# Patient Record
Sex: FEMALE | Race: WHITE
[De-identification: names, ages, dates, MRNs, and addresses within clinical notes are randomized per-mention and may not be internally consistent; named-entity substitution may affect disease eponyms.]

---

## 2018-01-10 NOTE — MM
Reason for exam: screening  (asymptomatic).

Last mammogram was performed 1 year and 2 months ago.



History:

Family history of breast cancer in mother at age 76.

Taking hormonal contraceptives for 5 years.



Physical Findings:

A clinical breast exam by your physician is recommended on an annual basis and 

results should be correlated with mammographic findings.



MG 3D Screening Mammo W/Cad

Bilateral CC and MLO view(s) were taken.

Prior study comparison: November 23, 2016, bilateral MG screening mammo w CAD.  

November 19, 2015, bilateral MG screening mammo w CAD.  November 10, 2014, 

bilateral MG screening mammo w CAD.

The breast tissue is heterogeneously dense. This may lower the sensitivity of 

mammography.  There is no discrete abnormality.





ASSESSMENT: Benign, BI-RAD 2



RECOMMENDATION:

Routine screening mammogram of both breasts in 1 year.

## 2018-06-04 ENCOUNTER — HOSPITAL ENCOUNTER (OUTPATIENT)
Dept: HOSPITAL 47 - RADUSMAIN | Age: 48
Discharge: HOME | End: 2018-06-04
Payer: MEDICARE

## 2018-06-04 DIAGNOSIS — H53.459: Primary | ICD-10-CM

## 2018-06-04 PROCEDURE — 70470 CT HEAD/BRAIN W/O & W/DYE: CPT

## 2018-06-04 PROCEDURE — 93880 EXTRACRANIAL BILAT STUDY: CPT

## 2018-06-05 NOTE — US
EXAMINATION TYPE: US carotid duplex BILAT

 

DATE OF EXAM: 6/4/2018

 

COMPARISON: US 9/16/2016

 

CLINICAL HISTORY: H53.459 Loss of peripheral vision.

 

EXAM MEASUREMENTS: 

 

RIGHT:  Peak Systolic Velocity (PSV) cm/sec

----- Right CCA:  90.4  

----- Right ICA:  82.9     

----- Right ECA:  105.7   

ICA/CCA ratio:  0.9    

 

RIGHT:  End Diastole cm/sec

----- Right CCA:  21.0   

----- Right ICA:  23.5      

----- Right ECA:  11.0     

 

LEFT:  Peak Systolic Velocity (PSV) cm/sec

----- Left CCA:  93.5  

----- Left ICA:  92.0   

----- Left ECA:  107.7  

ICA/CCA ratio:  1.0  

 

LEFT:  End Diastole cm/sec

----- Left CCA:  20.0  

----- Left ICA:  39.1   

----- Left ECA:  17.0 

 

VERTEBRALS (direction of flow):

Right Vertebral: Antegrade

Left Vertebral: Antegrade

 

Rhythm:  Normal

 

Minimal amount of plaque visualized bilaterally. No elevated velocities. No significant stenosis. 

 

 

 

IMPRESSION:  

1. No evidence of significant hemodynamic stenosis by carotid ultrasound.

2. Minimal plaque bilaterally.   

 

 

Criteria for Assigning % of Stenosis / Diameter reduction

(Estimation based on the indirect measurements of the internal carotid artery velocities (ICA PSV).

1.  Normal (no stenosis)=ICA PSV < 125 cm/s: ratio < 2.0: ICA EDV<40 cm/s.

2. Less than 50% stenosis=ICA PSV < 125 cm/s: ratio < 2.0: ICA EDV<40 cm/s.

3.  50 to 69% stenosis=ICA PSV of 125 to 230 cm/s: ration 2.0 ? 4.0: ICA EDV  cm/s.

4.  Greater than 70% stenosis to near occlusion= ICA PSV > 230 cm/s: ratio > 4.0: ICA EDV > 100 cm/s.
 

5.  Near occlusion= ICA PSV velocities may be low or undetectable: variable ratio and ICA EDV.

6.  Total occlusion=unable to detect flow.

## 2018-06-06 NOTE — CT
EXAMINATION TYPE: CT brain wo/w con

 

DATE OF EXAM: 6/4/2018

 

COMPARISON: NONE

 

HISTORY: Loss of peripheral vision x 6-9 months.

 

CT DLP: 1949.1mGycm

 

CONTRAST: 

CT scan of the head is performed without and with IV Contrast, patient injected with 100 mL of Isovue
 M300.

 

Unenhanced followed by contrast enhanced CT of the brain is submitted for evaluation.  The ventricles
 are midline.  There is no evidence for intracranial hemorrhage or extra-axial collection.  No mass e
ffects are identified.  Visualized bony calvarium is intact.  Contrast is administered and no enhanci
ng lesions are detected.  No pathologic enhancement is identified.  If symptoms persist consider MRI.
 Near-complete opacification left maxillary sinus.

 

IMPRESSION: No acute intracranial process or enhancing lesion. If symptoms persist consider MRI.

## 2018-06-20 ENCOUNTER — HOSPITAL ENCOUNTER (OUTPATIENT)
Dept: HOSPITAL 47 - LABWHC1 | Age: 48
Discharge: HOME | End: 2018-06-20
Attending: OPHTHALMOLOGY
Payer: MEDICARE

## 2018-06-20 DIAGNOSIS — H20.9: Primary | ICD-10-CM

## 2018-06-20 DIAGNOSIS — Z53.9: ICD-10-CM

## 2019-03-15 ENCOUNTER — HOSPITAL ENCOUNTER (OUTPATIENT)
Dept: HOSPITAL 47 - RADNMMAIN | Age: 49
End: 2019-03-15
Attending: PSYCHIATRY & NEUROLOGY
Payer: MEDICARE

## 2019-03-15 DIAGNOSIS — G25.0: Primary | ICD-10-CM

## 2019-03-15 PROCEDURE — 78607: CPT

## 2019-03-15 NOTE — NM
EXAMINATION TYPE: NM DatScan Brain SPECT

 

DATE OF EXAM: 3/15/2019

 

COMPARISON: 11/2/2017

 

HISTORY: Tremors and vision loss

 

TECHNIQUE:  10 drops of Lugol's solution was administered 1 hour prior to injection as a thyroid bloc
vira agent.  After the administration of 4.35 mCi I-123 Ioflupane DaTscan.  Images obtained 3 hours p
ost injection.  SPECT images of the brain were acquired with axial and coronal reconstructions.  

 

FINDINGS: The axial SPECT images demonstrate normal background activity.  Accounting for head tilt, t
here appears to be symmetric shaped comma-shaped appearance of the bilateral corpus striatum.  

 

IMPRESSION: No abnormality identified..

## 2019-06-16 ENCOUNTER — HOSPITAL ENCOUNTER (OUTPATIENT)
Dept: HOSPITAL 47 - EC | Age: 49
Setting detail: OBSERVATION
LOS: 2 days | Discharge: HOME | End: 2019-06-18
Attending: HOSPITALIST | Admitting: HOSPITALIST
Payer: MEDICARE

## 2019-06-16 VITALS — BODY MASS INDEX: 41.5 KG/M2

## 2019-06-16 DIAGNOSIS — F41.9: ICD-10-CM

## 2019-06-16 DIAGNOSIS — G47.30: ICD-10-CM

## 2019-06-16 DIAGNOSIS — Z91.041: ICD-10-CM

## 2019-06-16 DIAGNOSIS — M19.90: ICD-10-CM

## 2019-06-16 DIAGNOSIS — J45.21: Primary | ICD-10-CM

## 2019-06-16 DIAGNOSIS — H35.52: ICD-10-CM

## 2019-06-16 DIAGNOSIS — H40.9: ICD-10-CM

## 2019-06-16 DIAGNOSIS — M79.7: ICD-10-CM

## 2019-06-16 DIAGNOSIS — Z96.659: ICD-10-CM

## 2019-06-16 DIAGNOSIS — Z91.018: ICD-10-CM

## 2019-06-16 DIAGNOSIS — Z91.010: ICD-10-CM

## 2019-06-16 DIAGNOSIS — Z96.9: ICD-10-CM

## 2019-06-16 DIAGNOSIS — Z79.899: ICD-10-CM

## 2019-06-16 DIAGNOSIS — F32.9: ICD-10-CM

## 2019-06-16 DIAGNOSIS — J20.9: ICD-10-CM

## 2019-06-16 DIAGNOSIS — J01.00: ICD-10-CM

## 2019-06-16 DIAGNOSIS — Z99.89: ICD-10-CM

## 2019-06-16 DIAGNOSIS — Z87.39: ICD-10-CM

## 2019-06-16 PROCEDURE — 85025 COMPLETE CBC W/AUTO DIFF WBC: CPT

## 2019-06-16 PROCEDURE — 94760 N-INVAS EAR/PLS OXIMETRY 1: CPT

## 2019-06-16 PROCEDURE — 71046 X-RAY EXAM CHEST 2 VIEWS: CPT

## 2019-06-16 PROCEDURE — 96375 TX/PRO/DX INJ NEW DRUG ADDON: CPT

## 2019-06-16 PROCEDURE — 94640 AIRWAY INHALATION TREATMENT: CPT

## 2019-06-16 PROCEDURE — 93005 ELECTROCARDIOGRAM TRACING: CPT

## 2019-06-16 PROCEDURE — 99285 EMERGENCY DEPT VISIT HI MDM: CPT

## 2019-06-16 PROCEDURE — 96372 THER/PROPH/DIAG INJ SC/IM: CPT

## 2019-06-16 PROCEDURE — 80053 COMPREHEN METABOLIC PANEL: CPT

## 2019-06-16 PROCEDURE — 36415 COLL VENOUS BLD VENIPUNCTURE: CPT

## 2019-06-16 PROCEDURE — 99284 EMERGENCY DEPT VISIT MOD MDM: CPT

## 2019-06-16 PROCEDURE — 80048 BASIC METABOLIC PNL TOTAL CA: CPT

## 2019-06-16 PROCEDURE — 96376 TX/PRO/DX INJ SAME DRUG ADON: CPT

## 2019-06-16 PROCEDURE — 84484 ASSAY OF TROPONIN QUANT: CPT

## 2019-06-16 PROCEDURE — 96365 THER/PROPH/DIAG IV INF INIT: CPT

## 2019-06-17 LAB
ALBUMIN SERPL-MCNC: 4.8 G/DL (ref 3.5–5)
ALP SERPL-CCNC: 71 U/L (ref 38–126)
ALT SERPL-CCNC: 10 U/L (ref 9–52)
ANION GAP SERPL CALC-SCNC: 11 MMOL/L
AST SERPL-CCNC: 18 U/L (ref 14–36)
BASOPHILS # BLD AUTO: 0 K/UL (ref 0–0.2)
BASOPHILS NFR BLD AUTO: 0 %
BUN SERPL-SCNC: 9 MG/DL (ref 7–17)
CALCIUM SPEC-MCNC: 9.7 MG/DL (ref 8.4–10.2)
CHLORIDE SERPL-SCNC: 108 MMOL/L (ref 98–107)
CO2 SERPL-SCNC: 21 MMOL/L (ref 22–30)
EOSINOPHIL # BLD AUTO: 0.2 K/UL (ref 0–0.7)
EOSINOPHIL NFR BLD AUTO: 1 %
ERYTHROCYTE [DISTWIDTH] IN BLOOD BY AUTOMATED COUNT: 4.48 M/UL (ref 3.8–5.4)
ERYTHROCYTE [DISTWIDTH] IN BLOOD: 15.1 % (ref 11.5–15.5)
GLUCOSE BLD-MCNC: 119 MG/DL (ref 75–99)
GLUCOSE BLD-MCNC: 180 MG/DL (ref 75–99)
GLUCOSE SERPL-MCNC: 145 MG/DL (ref 74–99)
HCT VFR BLD AUTO: 43.2 % (ref 34–46)
HGB BLD-MCNC: 14.4 GM/DL (ref 11.4–16)
LYMPHOCYTES # SPEC AUTO: 0.4 K/UL (ref 1–4.8)
LYMPHOCYTES NFR SPEC AUTO: 3 %
MCH RBC QN AUTO: 32.1 PG (ref 25–35)
MCHC RBC AUTO-ENTMCNC: 33.3 G/DL (ref 31–37)
MCV RBC AUTO: 96.3 FL (ref 80–100)
MONOCYTES # BLD AUTO: 0.2 K/UL (ref 0–1)
MONOCYTES NFR BLD AUTO: 2 %
NEUTROPHILS # BLD AUTO: 12.5 K/UL (ref 1.3–7.7)
NEUTROPHILS NFR BLD AUTO: 94 %
PLATELET # BLD AUTO: 267 K/UL (ref 150–450)
POTASSIUM SERPL-SCNC: 4.3 MMOL/L (ref 3.5–5.1)
PROT SERPL-MCNC: 8 G/DL (ref 6.3–8.2)
SODIUM SERPL-SCNC: 140 MMOL/L (ref 137–145)
WBC # BLD AUTO: 13.4 K/UL (ref 3.8–10.6)

## 2019-06-17 RX ADMIN — INSULIN ASPART SCH UNIT: 100 INJECTION, SOLUTION INTRAVENOUS; SUBCUTANEOUS at 21:20

## 2019-06-17 RX ADMIN — HEPARIN SODIUM SCH UNIT: 5000 INJECTION, SOLUTION INTRAVENOUS; SUBCUTANEOUS at 17:17

## 2019-06-17 RX ADMIN — METHYLPREDNISOLONE SODIUM SUCCINATE SCH MG: 125 INJECTION, POWDER, FOR SOLUTION INTRAMUSCULAR; INTRAVENOUS at 17:16

## 2019-06-17 RX ADMIN — DULOXETINE SCH MG: 60 CAPSULE, DELAYED RELEASE ORAL at 17:16

## 2019-06-17 RX ADMIN — FORMOTEROL FUMARATE DIHYDRATE SCH MCG: 20 SOLUTION RESPIRATORY (INHALATION) at 20:39

## 2019-06-17 RX ADMIN — IPRATROPIUM BROMIDE AND ALBUTEROL SULFATE SCH ML: .5; 3 SOLUTION RESPIRATORY (INHALATION) at 20:39

## 2019-06-17 RX ADMIN — INSULIN ASPART SCH: 100 INJECTION, SOLUTION INTRAVENOUS; SUBCUTANEOUS at 17:44

## 2019-06-17 RX ADMIN — IPRATROPIUM BROMIDE AND ALBUTEROL SULFATE SCH ML: .5; 3 SOLUTION RESPIRATORY (INHALATION) at 15:57

## 2019-06-17 RX ADMIN — IPRATROPIUM BROMIDE AND ALBUTEROL SULFATE SCH ML: .5; 3 SOLUTION RESPIRATORY (INHALATION) at 12:10

## 2019-06-17 RX ADMIN — IPRATROPIUM BROMIDE AND ALBUTEROL SULFATE SCH ML: .5; 3 SOLUTION RESPIRATORY (INHALATION) at 09:11

## 2019-06-17 RX ADMIN — BUDESONIDE SCH MG: 1 SUSPENSION RESPIRATORY (INHALATION) at 20:39

## 2019-06-17 NOTE — ED
URI HPI





- General


Source: patient


Mode of arrival: wheelchair


Limitations: no limitations





<April Barba - Last Filed: 06/17/19 04:01>





<Charley Pandey P - Last Filed: 06/24/19 03:27>





- General


Chief Complaint: Upper Respiratory Infection


Stated Complaint: EMMA


Time Seen by Provider: 06/16/19 23:57





- History of Present Illness


Initial Comments: 


49-year-old female to history of asthma presenting today for chief complaint of 

cough congestion and sinus pressure for the past 2 days.  Patient states she has

had sinus pressure that increases with downward had movements.  She denies any 

headache or neck stiffness.  She states that she also has history of asthma has 

noticed wheezing and increasing short of breath and feels similar to which the 

previous asthma exacerbations.  Patient states she did perform an albuterol 

treatment prior to her presentation in the emergency department.  She states she

did not have very much improvement.  Patient denies any leg swelling chest pain 

hemoptysis.  Patient denies any sputum production.  Patient states she has had 

chills but has not recorded a fever at home.  Remaining review of systems 

negative upon arrival patient appears well no signs of acute distress.  Afebri

le.


 (April Barba)





- Related Data


                                    Allergies











Allergy/AdvReac Type Severity Reaction Status Date / Time


 


Iodinated Contrast- Oral and Allergy  Rash/Hives Verified 06/17/19 07:20





IV Dye     


 


peanut Allergy  Unknown Verified 06/17/19 07:20


 


soy Allergy  Rash/Hives Verified 06/17/19 07:20














Review of Systems


ROS Other: All systems not noted in ROS Statement are negative.





<April Barba - Last Filed: 06/17/19 04:01>


ROS Other: All systems not noted in ROS Statement are negative.





<Charley Pandey - Last Filed: 06/24/19 03:27>


ROS Statement: 


Those systems with pertinent positive or pertinent negative responses have been 

documented in the HPI.








Past Medical History


Additional Past Medical History / Comment(s): glaucoma, RP


History of Any Multi-Drug Resistant Organisms: None Reported


Past Surgical History: Orthopedic Surgery


Additional Past Surgical History / Comment(s): neuro stimulator, knee 

replacement


Past Psychological History: Depression


Smoking Status: Never smoker


Past Alcohol Use History: Rare


Past Drug Use History: None Reported





<April Barba - Last Filed: 06/17/19 04:01>





General Exam


Limitations: no limitations





<April Barba MOJGAN - Last Filed: 06/17/19 04:01>





- General Exam Comments


Initial Comments: 


General:  The patient is awake and alert, in no distress, and does not appear 

acutely ill. 


Eye:  +3 mm pupils are equal, round and reactive to light, extra-ocular 

movements are intact.  No nystagmus.  There is normal conjunctiva bilaterally.  

No signs of icterus.  No photophobia


Ears, nose, mouth and throat:  There are moist mucous membranes and no oral 

lesions.  Oropharynx was not erythematous there is no tonsillar enlargement 

exudates or lesions.  Uvula midline.  Tympanic membranes are not erythematous or

is no effusions bulging or retraction.  No tenderness to palpation of the 

mastoid.  No anterior cervical lymphadenopathy.  Rhinorrhea, clear and bilateral

nares.  No tripoding, no drooling.


Neck:  The neck is supple, there is no tenderness or JVD.  No nuchal rigidity 

negative Brudzinski and Kernig


Cardiovascular:  There is a regular rate and rhythm. No murmur, rub or gallop is

appreciated.


Respiratory:  Lungs sounds are diminished, there is expiratory wheeze. 

Respirations are non-labored, breath sounds are equal.  No stridor, rales, or 

rhonchi.  No retractions or abdominal breathing.


Gastrointestinal:  Soft, non-distended, non-tender abdomen without masses or 

organomegaly noted. There is no rebound or guarding present.  Bowel sounds are 

unremarkable.


Musculoskeletal:  Normal ROM, no tenderness.  Strength 5/5. Sensation intact. 

Radial pulses equal bilaterally 2+.  


Neurological:  A&O x 3. CN II-XII intact, There are no obvious motor or sensory 

deficits. Coordination appears grossly intact. Speech appears normal, no 

muffling.


Skin:  Skin is warm and dry and no rashes or lesions are noted.  No extremity 

edema


Psychiatric:  Cooperative


 (April Barba)





Course





<PeterApril hodges - Last Filed: 06/17/19 04:01>





                                   Vital Signs











  06/16/19 06/17/19 06/17/19





  23:41 00:09 00:35


 


Temperature 98.4 F  


 


Pulse Rate 96  95


 


Respiratory 20 20 





Rate   


 


Blood Pressure 137/85  


 


O2 Sat by Pulse 95  





Oximetry   














  06/17/19 06/17/19 06/17/19





  00:43 01:57 02:07


 


Temperature   


 


Pulse Rate 88 83 87


 


Respiratory   





Rate   


 


Blood Pressure   


 


O2 Sat by Pulse   





Oximetry   














  06/17/19 06/17/19 06/17/19





  02:56 03:11 03:27


 


Temperature   


 


Pulse Rate 84 88 86


 


Respiratory   18





Rate   


 


Blood Pressure   130/92


 


O2 Sat by Pulse   98





Oximetry   














- Reevaluation(s)


Reevaluation #1: 


Upon reevaluation patient states she had improvement after 1 DuoNeb treatment.


Examination revealed continued to wheeze however improvement in air movement.


06/17/19 01:38


 (April Barba)


Reevaluation #2: 


Patient was reevaluated after 2 additional nebulized treatments, minimal 

improvement, patient given 1g Magnesium. Will admit at this time for asthma 

exacerbation


06/17/19 04:01


 (April Barba)





Medical Decision Making





- Lab Data


Result diagrams: 


                                 06/17/19 03:01





                                 06/17/19 03:01





<April Barba - Last Filed: 06/17/19 04:01>





- Lab Data


Result diagrams: 


                                 06/18/19 07:26





                                 06/18/19 07:26





<Charley Pandey - Last Filed: 06/24/19 03:27>





- Medical Decision Making


49-year-old female presenting today for chief complaint of congestion and cough 

sinus pressure and shortness of breath.  Patient has history of asthma.  

Significant diminishment of lung sounds as well as extra wheeze.  Patient is 

able to speak complete sentences was oxygenating at 95% on room air upon 

arrival.  Patient was abdominal breathing, no retractions or significant 

tachypnea. Patient had 1 treatment prior to arrival in the ED. & a total of 3 

treatments in the ER with minimal improvement. Given magnesium. Patient was 

evaluated by attending provider in the ER. Patient CXR WNL. Patient has 

tenderness to palpation of the maxillary sinuses. Given Augmentin. Patient will 

be admitted for breathing treatments, IV steroids. Total of 30 minutes of 

critical care was spent on patient. Dr. Pandey is agreeable with admission. 

Patient is agreeable with admission. Patient transferred to floor in stable 

condition.


 (April Barba)


I was available for consultation in the emergency department. The history and 

physical exam were done by the midlevel provider.  I was consulted for this 

patient's care.  I reviewed the case with the midlevel provider and based on 

their presentation of the patient, I agree with the assessment, medical decision

making and plan of care as documented.





Chart was dictated using Dragon dictation software.  Attempts were made to 

correct any dictation errors however some typographical errors may persist. 

(Charley Pandey)





- Lab Data





                                   Lab Results











  06/17/19 06/17/19 Range/Units





  03:01 03:01 


 


WBC  13.4 H   (3.8-10.6)  k/uL


 


RBC  4.48   (3.80-5.40)  m/uL


 


Hgb  14.4   (11.4-16.0)  gm/dL


 


Hct  43.2   (34.0-46.0)  %


 


MCV  96.3   (80.0-100.0)  fL


 


MCH  32.1   (25.0-35.0)  pg


 


MCHC  33.3   (31.0-37.0)  g/dL


 


RDW  15.1   (11.5-15.5)  %


 


Plt Count  267   (150-450)  k/uL


 


Neutrophils %  94   %


 


Lymphocytes %  3   %


 


Monocytes %  2   %


 


Eosinophils %  1   %


 


Basophils %  0   %


 


Neutrophils #  12.5 H   (1.3-7.7)  k/uL


 


Lymphocytes #  0.4 L   (1.0-4.8)  k/uL


 


Monocytes #  0.2   (0-1.0)  k/uL


 


Eosinophils #  0.2   (0-0.7)  k/uL


 


Basophils #  0.0   (0-0.2)  k/uL


 


Sodium   140  (137-145)  mmol/L


 


Potassium   4.3  (3.5-5.1)  mmol/L


 


Chloride   108 H  ()  mmol/L


 


Carbon Dioxide   21 L  (22-30)  mmol/L


 


Anion Gap   11  mmol/L


 


BUN   9  (7-17)  mg/dL


 


Creatinine   0.66  (0.52-1.04)  mg/dL


 


Est GFR (CKD-EPI)AfAm   >90  (>60 ml/min/1.73 sqM)  


 


Est GFR (CKD-EPI)NonAf   >90  (>60 ml/min/1.73 sqM)  


 


Glucose   145 H  (74-99)  mg/dL


 


Calcium   9.7  (8.4-10.2)  mg/dL


 


Total Bilirubin   0.6  (0.2-1.3)  mg/dL


 


AST   18  (14-36)  U/L


 


ALT   10  (9-52)  U/L


 


Alkaline Phosphatase   71  ()  U/L


 


Total Protein   8.0  (6.3-8.2)  g/dL


 


Albumin   4.8  (3.5-5.0)  g/dL














Critical Care Time


Critical Care Time: Yes


Total Critical Care Time: 30





<Charley Pandey - Last Filed: 06/24/19 03:27>





Disposition


Is patient prescribed a controlled substance at d/c from ED?: No


Time of Disposition: 03:57


Decision to Admit Reason: Admit from EC


Decision Date: 06/17/19


Decision Time: 03:57





<April Barba - Last Filed: 06/17/19 04:01>





<Charley Pandey - Last Filed: 06/24/19 03:27>


Clinical Impression: 


 Shortness of breath, Sinus pressure, Asthma exacerbation





Disposition: ADMITTED AS IP TO THIS HOSP


Condition: Serious

## 2019-06-17 NOTE — HP
HISTORY AND PHYSICAL



CHIEF COMPLAINT:

Shortness of breath.



HISTORY OF PRESENT ILLNESS:

This 49-year-old woman with a past medical history of multiple medical problems,

including asthma, fibromyalgia, history of sleep apnea, history of glaucoma, 
history of

bulging and herniated discs, history of anxiety, depression, being followed by 
Dr. Shantel Maradiaga in the outpatient setting, was living in [place].  The patient has
respiratory difficulties.  

 because of increased shortness of breath and cough and sputum, patient came

 to [facility] for further evaluation and treatment.  There is no 
history

of any fever, rigor or chills. No history of headache, loss of consciousness, 
seizures

at this time.



PAST MEDICAL HISTORY:

1. Asthma.

2. Fibromyalgia.

3. Sleep apnea.

4. History of glaucoma.

5. History of bulging discs.



HOME MEDICATIONS:

1. Spiriva 1 puff daily.

2. Topamax 100 mg p.o. daily.

3. Inderal XL 80 mg p.o. daily.

4. Methotrexate 25 mg Thursday.

5. Xalatan 0.005% one drop both eyes at bedtime.

6. Klonopin 0.5 mg t.i.d.

7. Cymbalta 60 mg daily.

8. Abilify 10 mg daily.



ALLERGIES:

1. IODINATED CONTRAST.

2. PEANUT.

3. SOY.



FAMILY HISTORY:

No history of heart disease or strokes in the family.



SOCIAL HISTORY:

No history of smoking.  No history of alcohol intake.



REVIEW OF SYSTEMS:

ENT: No diminished hearing. No diminished vision.

CARDIOVASCULAR SYSTEM: As mentioned earlier.

RESPIRATORY SYSTEM: As mentioned earlier.

GI: No nausea, vomiting, diarrhea.

: No dysuria or retention.

NERVOUS SYSTEM: No numbness, weakness.

ALLERGY/IMMUNOLOGY: No asthma, hayfever.

MUSCULOSKELETAL: As mentioned earlier.

HEMATOLOGY/ONCOLOGY: No history of anemia.

ENDOCRINE: No history of diabetes, hypothyroidism.

CONSTITUTIONAL: As mentioned earlier.

DERMATOLOGY: Negative.

RHEUMATOLOGY: Negative.

PSYCHIATRY: As mentioned earlier.



PHYSICAL EXAMINATION:

Patient is alert and oriented x3. Pulse 79, blood pressure 123/83, respiration 
15,

temperature 98.5, pulse ox 97% on room air.

HEENT: Conjunctivae normal. Oral mucosa moist.

NECK: No jugular venous distention. No carotid bruit. No lymph node enlargement.

CARDIOVASCULAR SYSTEM:  S1, S2 muffled.

RESPIRATORY SYSTEM: Breath sounds diminished at the bases.  A few scattered 
rhonchi and

crackles.  Expiratory wheezing also present.

ABDOMEN: Soft, non-tender. No mass palpable.

LEGS: No edema. No swelling.

NERVOUS SYSTEM: Higher functions as mentioned earlier. Moves all 4 limbs. No 
focal

motor or sensory deficit.

LYMPHATICS: No lymph node palpable in neck, axillae or groin.

SKIN: No ulcer, rash, bleeding.

JOINTS: No active deforming arthropathy.



LABS:

WBC 13.4, hemoglobin is 14.4.



ASSESSMENT:

1. Bronchial asthma, acute exacerbation, with acute purulent tracheobronchitis.

2. Increased white count.

4. Asthma.

5. Fibromyalgia.

6. Sleep apnea.

7. Glaucoma.

8. History of degenerative joint disease.

9. History of anxiety, depression.

10.History of nerve stimulator.

11.History of knee replacements.



RECOMMENDATIONS AND DISCUSSION:

In this 49-year-old woman who presented with multiple medical issues, at this 
time we

will monitor the patient closely, continue the current medications, continue 
with

symptomatic treatment. I would also recommend intensive bronchodilators, 
steroids and

empiric antibiotics. Pulmonary consultation.  The patient also had some minimal 
wide-

complex QRS complexes in the EKG.  I would recommend EKG and a set of troponins 
as

well.  Prognosis is guarded because of multiple complex medical issues. Further

recommendations to follow. A copy of this dictation is being forwarded to Dr. Maradiaga,

who is the primary physician.



A recent SPECT MRI did not show any acute abnormality.





MMODL / MKN: [id number] / Job#: [id number]

GIANCARLO

## 2019-06-17 NOTE — P.CNPUL
History of Present Illness


Consult date: 06/17/19


Requesting physician: Katia Wade


Reason for consult: asthma


Chief complaint: shortness of breath cough wheezing


History of present illness: 





this is a 49-year-old female with history of asthma since she was a child.  

Patient presented to the ER with 2 days history of sinuses congestion, went on 

to develop cough wheezing and shortness of breath for the last 24 hours.  

Patient denies any fever or chills, denies any hemoptysis, denies any chest 

pain.  Apparently when she was seen in the ER her physical findings were quite 

significant, patient was not improving with bronchodilators, hence patient was 

admitted, placed on antibiotics, steroids, and I was asked to see her on 

consultation.  During my evaluation, the patient was already feeling a bit 

better, less cough and less wheezing less shortness of breath, and she had no 

pressure or fullness over her sinuses during my examination.  Patient is a 

nonsmoker, she is known to have history of asthma but usually her asthma is 

fairly well controlled uses only albuterol as needed.  Patient is also known to 

have history of multiple ALLERGIES including ALLERGY to IV dye, peanuts, and 

soy.





Review of Systems


ONSTITUTIONAL: No fever, no malaise, no fatigue. 


HEENT: No recent visual problems or hearing problems. Denied any sore throat. 

known to have history of retinitis pigmentosa


CARDIOVASCULAR: No  orthopnea, PND, no palpitations, no syncope. 


PULMONARY: as noted in HPI, cough wheezing and shortness of breath and pressure 

and fullness over her sinuses. 


GASTROINTESTINAL: No diarrhea, no nausea, no vomiting, no abdominal pain. 

Normoactive bowel sounds. 


NEUROLOGICAL: No headaches, no weakness, no numbness. .


HEMATOLOGICAL: Denies any bleeding or petechiae. 


GENITOURINARY: Denies any burning micturition, frequency, or urgency. r


MUSCULOSKELETAL/RHEUMATOLOGICAL: Denies any joint pain, swelling, or any muscle 

pain. 


ENDOCRINE: Denies any polyuria or polydipsia.











Past Medical History


Past Medical History: Asthma, Fibromyalgia, Sleep Apnea/CPAP/BIPAP


Additional Past Medical History / Comment(s): glaucoma, RP, bulging and 

herniated discs


History of Any Multi-Drug Resistant Organisms: None Reported


Past Surgical History: Orthopedic Surgery


Additional Past Surgical History / Comment(s): neuro stimulator, knee 

replacement


Past Psychological History: Anxiety, Depression


Smoking Status: Never smoker


Past Alcohol Use History: Rare


Past Drug Use History: None Reported





Medications and Allergies


                                Home Medications











 Medication  Instructions  Recorded  Confirmed  Type


 


ARIPiprazole [Abilify] 10 mg PO DAILY 06/17/19 06/17/19 History


 


DULoxetine HCL [Cymbalta] 60 mg PO DAILY 06/17/19 06/17/19 History


 


Latanoprost [Xalatan 0.005%] 1 drop BOTH EYES HS 06/17/19 06/17/19 History


 


Methotrexate Sodium [Methotrexate] 25 mg PO TH 06/17/19 06/17/19 History


 


Propranolol HCl [Inderal Xl] 80 mg PO DAILY 06/17/19 06/17/19 History


 


Topiramate [Topamax] 100 mg PO DAILY 06/17/19 06/17/19 History


 


Trospium Chloride 20 mg PO DAILY 06/17/19 06/17/19 History


 


clonazePAM [KlonoPIN] 0.5 mg PO TID 06/17/19 06/17/19 History








                                    Allergies











Allergy/AdvReac Type Severity Reaction Status Date / Time


 


Iodinated Contrast- Oral and Allergy  Rash/Hives Verified 06/17/19 07:20





IV Dye     


 


peanut Allergy  Unknown Verified 06/17/19 07:20


 


soy Allergy  Rash/Hives Verified 06/17/19 07:20














Physical Exam


Vitals: 


                                   Vital Signs











  Temp Pulse Pulse Resp BP BP Pulse Ox


 


 06/17/19 16:12   76     


 


 06/17/19 15:59   74     


 


 06/17/19 15:17     15   


 


 06/17/19 15:00  98.9 F   74  15   104/68  97


 


 06/17/19 12:20   78     


 


 06/17/19 12:10   79     


 


 06/17/19 09:22   85     


 


 06/17/19 09:12   79      97


 


 06/17/19 07:00  98.5 F   86  15   123/83  97


 


 06/17/19 04:59  98.2 F   78  16   122/80  98


 


 06/17/19 03:27   86   18  130/92   98


 


 06/17/19 03:11   88     


 


 06/17/19 02:56   84     


 


 06/17/19 02:07   87     


 


 06/17/19 01:57   83     


 


 06/17/19 00:43   88     


 


 06/17/19 00:35   95     


 


 06/17/19 00:09     20   


 


 06/16/19 23:41  98.4 F  96   20  137/85   95








                                Intake and Output











 06/17/19 06/17/19 06/17/19





 06:59 14:59 22:59


 


Other:   


 


  # Voids 1 2 


 


  Weight 99.79 kg  














Physical Exam: Revealed 49-year-old female in no distress.


Head: Atraumatic normocephalic.


HEENT:[Neck is supple.] [No neck masses.] [No thyromegaly.] [No JVD.]


Chest: [wheezing bilaterally more so on forced expiratory maneuver..]


Cardiac Exam: [Normal S1 and S2, no S3 gallop, no murmur.]


Abdomen: [Soft, nontender,  no megaly, no rebound, no guarding, normal bowel 

sounds.]


Extremities: [No clubbing, no edema, no cyanosis.]


Neurological Exam: [No focal neurologic deficit.]


skin: No rashes.


Lymphatics: No lymphadenopathy.


Psychiatric: Normal mood affect and mental status examination





Results





- Laboratory Findings


CBC and BMP: 


                                 06/17/19 03:01





                                 06/17/19 03:01


Abnormal lab findings: 


                                  Abnormal Labs











  06/17/19 06/17/19 06/17/19





  03:01 03:01 17:17


 


WBC  13.4 H  


 


Neutrophils #  12.5 H  


 


Lymphocytes #  0.4 L  


 


Chloride   108 H 


 


Carbon Dioxide   21 L 


 


Glucose   145 H 


 


POC Glucose (mg/dL)    119 H














- Diagnostic Findings


Chest x-ray: image reviewed (no evidence of active disease)





Assessment and Plan


Assessment: 





impression: Acute exacerbation of bronchial asthma





Acute maxillary sinusitis





Acute upper respirator tract infection





History of retinitis pigmentosa.





Recommendation: Continue present treatment plan including present 

bronchodilators/DuoNeb, methylprednisolone, Rocephin,Pulmicort updrafts, patient

will likely be much better in the next 24-48 hours, and consider discharge 

planning once the patient is significantly improved.  We'll continue to follow.


Time with Patient: Greater than 30

## 2019-06-17 NOTE — XR
EXAM:

  XR Chest, 2 Views

 

CLINICAL HISTORY:

  ITS.REASON XR Reason: Pain

 

TECHNIQUE:

  Frontal and lateral views of the chest.

 

COMPARISON:

  No relevant prior studies available.

 

FINDINGS:

  Lungs:  Unremarkable.  The lungs are clear.

  Pleural space:  Unremarkable.  No pneumothorax.

  Heart:  Unremarkable.  No cardiomegaly.

  Mediastinum:  Unremarkable.

  Bones/joints:  Unremarkable.

  Tubes, lines and devices:  Presumed spinal cord and vagal stimulator 

devices.

 

IMPRESSION:     

  No acute findings.

## 2019-06-18 VITALS — RESPIRATION RATE: 15 BRPM

## 2019-06-18 VITALS — SYSTOLIC BLOOD PRESSURE: 115 MMHG | TEMPERATURE: 98.2 F | DIASTOLIC BLOOD PRESSURE: 74 MMHG

## 2019-06-18 VITALS — HEART RATE: 72 BPM

## 2019-06-18 LAB
ANION GAP SERPL CALC-SCNC: 9 MMOL/L
BASOPHILS # BLD AUTO: 0 K/UL (ref 0–0.2)
BASOPHILS NFR BLD AUTO: 0 %
BUN SERPL-SCNC: 14 MG/DL (ref 7–17)
CALCIUM SPEC-MCNC: 9.3 MG/DL (ref 8.4–10.2)
CHLORIDE SERPL-SCNC: 112 MMOL/L (ref 98–107)
CO2 SERPL-SCNC: 21 MMOL/L (ref 22–30)
EOSINOPHIL # BLD AUTO: 0 K/UL (ref 0–0.7)
EOSINOPHIL NFR BLD AUTO: 0 %
ERYTHROCYTE [DISTWIDTH] IN BLOOD BY AUTOMATED COUNT: 4.12 M/UL (ref 3.8–5.4)
ERYTHROCYTE [DISTWIDTH] IN BLOOD: 15.2 % (ref 11.5–15.5)
GLUCOSE BLD-MCNC: 126 MG/DL (ref 75–99)
GLUCOSE BLD-MCNC: 130 MG/DL (ref 75–99)
GLUCOSE SERPL-MCNC: 123 MG/DL (ref 74–99)
HCT VFR BLD AUTO: 39.9 % (ref 34–46)
HGB BLD-MCNC: 13.2 GM/DL (ref 11.4–16)
LYMPHOCYTES # SPEC AUTO: 0.8 K/UL (ref 1–4.8)
LYMPHOCYTES NFR SPEC AUTO: 6 %
MCH RBC QN AUTO: 32.1 PG (ref 25–35)
MCHC RBC AUTO-ENTMCNC: 33.1 G/DL (ref 31–37)
MCV RBC AUTO: 96.9 FL (ref 80–100)
MONOCYTES # BLD AUTO: 0.2 K/UL (ref 0–1)
MONOCYTES NFR BLD AUTO: 2 %
NEUTROPHILS # BLD AUTO: 11.3 K/UL (ref 1.3–7.7)
NEUTROPHILS NFR BLD AUTO: 92 %
PLATELET # BLD AUTO: 293 K/UL (ref 150–450)
POTASSIUM SERPL-SCNC: 4.2 MMOL/L (ref 3.5–5.1)
SODIUM SERPL-SCNC: 142 MMOL/L (ref 137–145)
WBC # BLD AUTO: 12.3 K/UL (ref 3.8–10.6)

## 2019-06-18 RX ADMIN — BUDESONIDE SCH MG: 1 SUSPENSION RESPIRATORY (INHALATION) at 08:50

## 2019-06-18 RX ADMIN — METHYLPREDNISOLONE SODIUM SUCCINATE SCH: 125 INJECTION, POWDER, FOR SOLUTION INTRAMUSCULAR; INTRAVENOUS at 15:09

## 2019-06-18 RX ADMIN — INSULIN ASPART SCH: 100 INJECTION, SOLUTION INTRAVENOUS; SUBCUTANEOUS at 07:22

## 2019-06-18 RX ADMIN — IPRATROPIUM BROMIDE AND ALBUTEROL SULFATE SCH ML: .5; 3 SOLUTION RESPIRATORY (INHALATION) at 08:50

## 2019-06-18 RX ADMIN — METHYLPREDNISOLONE SODIUM SUCCINATE SCH MG: 125 INJECTION, POWDER, FOR SOLUTION INTRAMUSCULAR; INTRAVENOUS at 00:01

## 2019-06-18 RX ADMIN — IPRATROPIUM BROMIDE AND ALBUTEROL SULFATE SCH ML: .5; 3 SOLUTION RESPIRATORY (INHALATION) at 12:44

## 2019-06-18 RX ADMIN — IPRATROPIUM BROMIDE AND ALBUTEROL SULFATE SCH ML: .5; 3 SOLUTION RESPIRATORY (INHALATION) at 15:33

## 2019-06-18 RX ADMIN — HEPARIN SODIUM SCH UNIT: 5000 INJECTION, SOLUTION INTRAVENOUS; SUBCUTANEOUS at 09:46

## 2019-06-18 RX ADMIN — METHYLPREDNISOLONE SODIUM SUCCINATE SCH MG: 125 INJECTION, POWDER, FOR SOLUTION INTRAMUSCULAR; INTRAVENOUS at 05:17

## 2019-06-18 RX ADMIN — FORMOTEROL FUMARATE DIHYDRATE SCH MCG: 20 SOLUTION RESPIRATORY (INHALATION) at 08:50

## 2019-06-18 RX ADMIN — DULOXETINE SCH MG: 60 CAPSULE, DELAYED RELEASE ORAL at 09:45

## 2019-06-18 RX ADMIN — INSULIN ASPART SCH: 100 INJECTION, SOLUTION INTRAVENOUS; SUBCUTANEOUS at 14:31

## 2019-06-18 NOTE — P.PN
Subjective


Progress Note Date: 06/18/19


Principal diagnosis: 





Acute exacerbation of mild intermittent asthma


this is a 49-year-old female with history of asthma since she was a child.  

Patient presented to the ER with 2 days history of sinuses congestion, went on 

to develop cough wheezing and shortness of breath for the last 24 hours.  

Patient denies any fever or chills, denies any hemoptysis, denies any chest 

pain.  Apparently when she was seen in the ER her physical findings were quite 

significant, patient was not improving with bronchodilators, hence patient was 

admitted, placed on antibiotics, steroids, and I was asked to see her on 

consultation.  During my evaluation, the patient was already feeling a bit b

ely, less cough and less wheezing less shortness of breath, and she had no 

pressure or fullness over her sinuses during my examination.  Patient is a 

nonsmoker, she is known to have history of asthma but usually her asthma is 

fairly well controlled uses only albuterol as needed.  Patient is also known to 

have history of multiple ALLERGIES including ALLERGY to IV dye, peanuts, and 

soy.





Reevaluated today on 6/18/2019, patient is feeling much better, hardly any 

wheezing, she does have some occasional cough.  No fever no chills no hemoptysis

no chest pain.  Labs were reviewed including CBC and basic metabolic profile.  

Patient is doing great compared to how she felt yesterday.  Hence I will r

ecommend that the patient could be discharged home today on prednisone 30 mg 

tapered over 2 weeks, Ceftin 500 twice a day for 10 days, albuterol updrafts 4 

times a day and when necessary, Symbicort 160/4.52 puffs twice a day, and 

Singulair 10 mg daily.  Patient is to follow-up with me in one week post 

discharge.











Objective





- Vital Signs


Vital signs: 


                                   Vital Signs











Temp  98.5 F   06/18/19 07:05


 


Pulse  71   06/18/19 12:54


 


Resp  15   06/18/19 07:05


 


BP  108/78   06/18/19 07:05


 


Pulse Ox  96   06/18/19 08:50








                                 Intake & Output











 06/17/19 06/18/19 06/18/19





 18:59 06:59 18:59


 


Other:   


 


  Voiding Method   Toilet


 


  # Voids 2 2 














- Exam





Physical Exam: Revealed 49-year-old female in no distress.


Head: Atraumatic normocephalic.


HEENT:[Neck is supple.] [No neck masses.] [No thyromegaly.] [No JVD.]


Chest: [Minimal wheezing on forced expiratory maneuver only.


Cardiac Exam: [Normal S1 and S2, no S3 gallop, no murmur.]


Abdomen: [Soft, nontender,  no megaly, no rebound, no guarding, normal bowel 

sounds.]


Extremities: [No clubbing, no edema, no cyanosis.]


Neurological Exam: [No focal neurologic deficit.]


skin: No rashes.


Lymphatics: No lymphadenopathy.


Psychiatric: Normal mood affect and mental status examination











- Labs


CBC & Chem 7: 


                                 06/18/19 07:26





                                 06/18/19 07:26


Labs: 


                  Abnormal Lab Results - Last 24 Hours (Table)











  06/17/19 06/17/19 06/18/19 Range/Units





  17:17 20:45 06:50 


 


WBC     (3.8-10.6)  k/uL


 


Neutrophils #     (1.3-7.7)  k/uL


 


Lymphocytes #     (1.0-4.8)  k/uL


 


Chloride     ()  mmol/L


 


Carbon Dioxide     (22-30)  mmol/L


 


Glucose     (74-99)  mg/dL


 


POC Glucose (mg/dL)  119 H  180 H  126 H  (75-99)  mg/dL














  06/18/19 06/18/19 06/18/19 Range/Units





  07:26 07:26 11:21 


 


WBC  12.3 H    (3.8-10.6)  k/uL


 


Neutrophils #  11.3 H    (1.3-7.7)  k/uL


 


Lymphocytes #  0.8 L    (1.0-4.8)  k/uL


 


Chloride   112 H   ()  mmol/L


 


Carbon Dioxide   21 L   (22-30)  mmol/L


 


Glucose   123 H   (74-99)  mg/dL


 


POC Glucose (mg/dL)    130 H  (75-99)  mg/dL














Assessment and Plan


Assessment: 





impression: Acute exacerbation of bronchial asthma





Acute maxillary sinusitis





Acute upper respirator tract infection





History of retinitis pigmentosa.





Recommendation: Suggest discharging the patient home today on prednisone 30 mg 

tapered over the next 2 weeks, Symbicort 160/4.52 puffs twice a day, Ceftin 500 

twice a day for 10 days, Singulair 10 mg daily at bedtime, patient will come 

back and see me in the office on outpatient basis hopefully in the next 7-10 

days.


Time with Patient: Less than 30

## 2019-06-19 NOTE — DS
DISCHARGE SUMMARY



FINAL DIAGNOSES:

1. Bronchial asthma acute exacerbation with acute purulent tracheobronchitis.

2. Increased WBC.

3. Asthma.

4. Fibromyalgia.

5. Sleep apnea.

6. Glaucoma.

7. History of degenerative joint disease.

8. History of anxiety, depression.

9. History of nerve stimulator.

10.History of knee replacement.



DISCHARGE DISPOSITION:

The patient will be discharged in stable condition with guarded prognosis.



HISTORY OF PRESENT ILLNESS:

This 49-year-old woman with a past medical history of multiple medical problems

admitted with bronchial asthma, acute exacerbation, acute purulent 
tracheobronchitis.

Patient treated with bronchodilators and IV steroids and antibiotics.  Patient 
improved

significantly. Dr. Mc saw the patient,  recommended outpatient followup.



On exam, vitals are stable.

CARDIOVASCULAR: S1, S2 muffled.

RESPIRATORY:  A few scattered rhonchi and crackles.

ABDOMEN:  Soft.

NERVOUS SYSTEM: No focal deficits.



 cleared the patient discharge.



DISCHARGE ADVICE:

1. Diet is cardiac.

2. Activity limited until followup.

3. Follow up with Dr. Maradiaga in 2 to 3 days.

4. Follow up with Pulmonary, Dr. Mc, as recommended.

MEDICATIONS ARE:

1. Abilify 10 mg p.o. daily.

2. Cymbalta 60 mg p.o. daily.

3. Inderal XL 80 mg p.o. daily.

4. Klonopin 0.5 mg p.o. t.i.d.

5. Methotrexate 25 mg p.o. Thursday.

6. Topamax 100 mg p.o. daily.

7. Xalatan 1 drop both eyes q.h.s.

8. Ceftin 500 mg p.o. b.i.d. for 3 days.

9. Prednisone taper 40 mg daily for 3 days, 30 for 3 days, 20 for 3 days 10 for 
3 days

    and stop.

10.Albuterol 2 puffs q.6.

11.Singulair 10 mg q.h.s.

12.Symbicort 160/4.5 two puffs b.i.d.

Once again, the patient will be discharged in a stable condition with guarded

prognosis.





MMODL / IJN: [id number] / Job#: [id number]

MTDD

## 2019-06-21 ENCOUNTER — HOSPITAL ENCOUNTER (OUTPATIENT)
Dept: HOSPITAL 47 - LABWHC1 | Age: 49
Discharge: HOME | End: 2019-06-21
Attending: HOSPITALIST
Payer: MEDICARE

## 2019-06-21 DIAGNOSIS — J45.909: Primary | ICD-10-CM

## 2019-06-21 LAB
ANION GAP SERPL CALC-SCNC: 10 MMOL/L (ref 4–12)
BASOPHILS # BLD AUTO: 0 K/UL (ref 0–0.2)
BASOPHILS NFR BLD AUTO: 0 %
BUN SERPL-SCNC: 14 MG/DL (ref 9–27)
BUN/CREAT SERPL: 15.56 RATIO (ref 12–20)
CALCIUM SPEC-MCNC: 9.3 MG/DL (ref 8.7–10.3)
CHLORIDE SERPL-SCNC: 107 MMOL/L (ref 96–109)
CO2 SERPL-SCNC: 23 MMOL/L (ref 21.6–31.8)
EOSINOPHIL # BLD AUTO: 0.1 K/UL (ref 0–0.7)
EOSINOPHIL NFR BLD AUTO: 1 %
ERYTHROCYTE [DISTWIDTH] IN BLOOD BY AUTOMATED COUNT: 4.56 M/UL (ref 3.8–5.4)
ERYTHROCYTE [DISTWIDTH] IN BLOOD: 13.7 % (ref 11.5–15.5)
GLUCOSE SERPL-MCNC: 124 MG/DL (ref 70–110)
HCT VFR BLD AUTO: 43.8 % (ref 34–46)
HGB BLD-MCNC: 14.3 GM/DL (ref 11.4–16)
LYMPHOCYTES # SPEC AUTO: 1.3 K/UL (ref 1–4.8)
LYMPHOCYTES NFR SPEC AUTO: 14 %
MCH RBC QN AUTO: 31.3 PG (ref 25–35)
MCHC RBC AUTO-ENTMCNC: 32.6 G/DL (ref 31–37)
MCV RBC AUTO: 96 FL (ref 80–100)
MONOCYTES # BLD AUTO: 0.1 K/UL (ref 0–1)
MONOCYTES NFR BLD AUTO: 1 %
NEUTROPHILS # BLD AUTO: 7.8 K/UL (ref 1.3–7.7)
NEUTROPHILS NFR BLD AUTO: 84 %
PLATELET # BLD AUTO: 391 K/UL (ref 150–450)
POTASSIUM SERPL-SCNC: 4.1 MMOL/L (ref 3.5–5.5)
SODIUM SERPL-SCNC: 140 MMOL/L (ref 135–145)
WBC # BLD AUTO: 9.4 K/UL (ref 3.8–10.6)

## 2019-06-21 PROCEDURE — 80048 BASIC METABOLIC PNL TOTAL CA: CPT

## 2019-06-21 PROCEDURE — 36415 COLL VENOUS BLD VENIPUNCTURE: CPT

## 2019-06-21 PROCEDURE — 85025 COMPLETE CBC W/AUTO DIFF WBC: CPT

## 2019-07-24 ENCOUNTER — HOSPITAL ENCOUNTER (OUTPATIENT)
Dept: HOSPITAL 47 - RADCTMAIN | Age: 49
Discharge: HOME | End: 2019-07-24
Attending: PSYCHIATRY & NEUROLOGY
Payer: MEDICARE

## 2019-07-24 DIAGNOSIS — M50.31: ICD-10-CM

## 2019-07-24 DIAGNOSIS — M99.71: ICD-10-CM

## 2019-07-24 DIAGNOSIS — M51.86: ICD-10-CM

## 2019-07-24 DIAGNOSIS — Z88.8: ICD-10-CM

## 2019-07-24 DIAGNOSIS — M51.36: ICD-10-CM

## 2019-07-24 DIAGNOSIS — M99.73: Primary | ICD-10-CM

## 2019-07-24 DIAGNOSIS — Z91.041: ICD-10-CM

## 2019-07-24 PROCEDURE — 72125 CT NECK SPINE W/O DYE: CPT

## 2019-07-24 PROCEDURE — 72131 CT LUMBAR SPINE W/O DYE: CPT

## 2019-07-24 NOTE — CT
EXAMINATION TYPE: CT lumbar spine wo con

 

DATE OF EXAM: 7/24/2019

 

COMPARISON: None

 

HISTORY: L-spine pain

 

CT DLP: 1372.1 mGycm

 

 

 

Unenhanced CT of the lumbar spine was performed.  Bone and soft tissue window settings are submitted 
as well as coronal and sagittal reconstructions. 

 

L1-L2: Normal disc space height.  No disc herniation protrusion or central stenosis.  No facet joint 
arthropathy.  No evidence for foraminal encroachment.  

 

L2-L3: Normal disc space height.  No disc herniation protrusion or central stenosis.  No facet joint 
arthropathy.  No evidence for foraminal encroachment.  

 

L3-L4: Normal disc space height.  No disc herniation protrusion or central stenosis.  No facet joint 
arthropathy.  No evidence for foraminal encroachment.  

 

L4-L5: Mild degenerative disc place narrowing. Posterior disc bulge.  No herniation or central stenos
is. No foraminal encroachment.

 

L5-S1: Normal disc space height.  No disc herniation protrusion or central stenosis.  No facet joint 
arthropathy.  No evidence for foraminal encroachment.  

 

No paraspinal masses are identified.  Lumbar segments are free if fracture.

 

IMPRESSION:

 

1. Degenerative disc space narrowing and disc bulging at L4-5.

## 2019-07-24 NOTE — CT
EXAMINATION TYPE: CT cervical spine wo con

 

DATE OF EXAM: 7/24/2019

 

COMPARISON: None

 

HISTORY: Cervicalgia

 

CT DLP: 758.3 mGycm

 

Unenhanced CT of the cervical spine was performed with bone and soft tissue window settings submitted
.  Coronal and sagittal reconstruction is obtained.  

 

C2-3: Within normal limits

 

C3-4: Mild degenerative disc space narrowing. Extensive streak artifact from neural transmitter resul
ts in limited evaluation of this level.

 

C4-5:Mild degenerative disc space narrowing. Extensive streak artifact from neural transmitter result
s in limited evaluation of this level.

 

5-6: Moderate degenerative disc space narrowing. Ventral spondylosis. Mild posterior disc bulge witho
ut herniation or central stenosis. No evidence for foraminal encroachment.

 

C6-7: Moderate to severe degenerative disc space narrowing. Ventral spondylosis. Posterior disc bulge
 with mild effacement ventral thecal sac. No evidence for antral stenosis or foraminal encroachment.

 

C7-T1: Within normal limits.

 

IMPRESSION:

1. Degenerative disc space narrowing as discussed.

2. Neural transmitter is noted extending from the C2 level through C4 level resulting in extensive st
reak artifact and exam limitation.

## 2020-08-07 ENCOUNTER — HOSPITAL ENCOUNTER (OUTPATIENT)
Dept: HOSPITAL 47 - RADPROMAIN | Age: 50
Discharge: HOME | End: 2020-08-07
Attending: NEUROLOGICAL SURGERY
Payer: MEDICARE

## 2020-08-07 VITALS — SYSTOLIC BLOOD PRESSURE: 118 MMHG | DIASTOLIC BLOOD PRESSURE: 70 MMHG | HEART RATE: 71 BPM

## 2020-08-07 VITALS — TEMPERATURE: 98.7 F

## 2020-08-07 VITALS — RESPIRATION RATE: 16 BRPM

## 2020-08-07 DIAGNOSIS — M50.123: ICD-10-CM

## 2020-08-07 DIAGNOSIS — Z96.82: ICD-10-CM

## 2020-08-07 DIAGNOSIS — Z90.49: ICD-10-CM

## 2020-08-07 DIAGNOSIS — G89.29: Primary | ICD-10-CM

## 2020-08-07 DIAGNOSIS — G54.4: ICD-10-CM

## 2020-08-07 PROCEDURE — 72132 CT LUMBAR SPINE W/DYE: CPT

## 2020-08-07 PROCEDURE — 62305 MYELOGRAPHY LUMBAR INJECTION: CPT

## 2020-08-07 PROCEDURE — 72126 CT NECK SPINE W/DYE: CPT

## 2020-08-07 NOTE — CT
EXAMINATION TYPE: CT cervical spine w con

 

DATE OF EXAM: 8/7/2020

 

COMPARISON: CT cervical spine July 24, 2019. MRI cervical spine August 22, 2013.

 

HISTORY: chronic pain

 

CT DLP: 670.6 mGycm.  Automated Exposure Control for Dose Reduction was Utilized.

 

TECHNIQUE:  CT scan of the cervical spine is obtained following intrathecal injection of contrast, ax
ial images are obtained, sagittal and coronal reformatted images are also reviewed. 15 cc of Isovue 2
00 injected.

 

FINDINGS: Successful opacification of the cervical spinal canal. Cervical spine is visualized in its 
entirety from C1 through upper thoracic levels, demonstrates stable alignment with normal cervical cu
rvature centered at C5-C6 level.  Prevertebral soft tissue appears within normal limits.  The C1-C2 a
rticulation is within normal limits on the coronal images. Vertebral body heights are maintained. Mod
erate anterior spurring and mild to moderate disc space narrowing C5-C6 and C6-C7 levels. Redemonstra
tion of spinal stimulator from mid C3 through mid C5 vertebra and the posterior spinal canal causing 
streak artifact limiting evaluation at these levels.

 

Axial images at C2-C3 level are felt within normal limits.

 

Axial images at C5-C6 level are within normal limits on axial image 48.

 

Axial images at C6-C7 level show persistent broad-based disc protrusion effacing the anterior thecal 
sac. Bilateral neural foramina are patent.

 

Axial images at C7-T1 level remain within normal limits.

 

Stimulators in the right mid to lower cervical paraspinal muscles noted.

 

IMPRESSION: Multilevel degenerative changes as detailed above. Suboptimal evaluating the C3-C4 level 
which had disc herniation on 2013 MRI due to artifact from spinal stimulator.  Disc herniation C6-C7 
level redemonstrated without obvious progression from 2013 MRI.

## 2020-08-07 NOTE — CT
EXAMINATION TYPE: CT lumbar spine w con

 

DATE OF EXAM: 8/7/2020

 

COMPARISON: CT lumbar spine July 24, 2019. MRI lumbar spine August 22, 2013.

 

HISTORY: chronic back pain.

 

CT DLP: 1358.2 mGycm

Automated exposure control for dose reduction was used.

 

CONTRAST: 

CT scan of the lumbar is performed after injection of intrathecal contrast, patient injected with 15 
ml mL of Isovue M200.

 

Enhanced CT of the lumbar spine was performed.  Bone and soft tissue window settings are submitted as
 well as coronal and sagittal reconstructions. 

 

5 lumbar type vertebra are redemonstrated. There is satisfactory alignment seen. Vertebral body heigh
ts and disc space heights are maintained. No suspicious new posterior disc herniations are seen. Ther
e is poor visualization of conus with poor separation of the ascending lumbosacral nerve fibers which
 are more clumped in appearance versus 2013 MRI.

 

Axial images show mild facet degenerative changes bilaterally at L2-L3 and L3-L4 levels.

 

Axial images at L4-L5 level show moderate facet degenerative changes bilaterally but spinal canal is 
preserved.

 

Axial images at L5-S1 level show mild to moderate facet degenerative changes bilaterally.

 

Neural foramina are patent bilaterally at all lumbar levels.

 

There is anteverted uterus with central metallic IUD. Spinal canal is not effaced. Cholecystectomy cl
ips are noted. Stimulator device in the left posterior soft tissue is noted.

 

IMPRESSION: No obvious new significant disc herniation. Only suspicious finding is poor separation of
 the ascending lumbosacral nerve roots raising concern for underlying arachnoiditis. Correlate clinic
ally.

## 2020-08-07 NOTE — FL
EXAMINATION TYPE: FL myelogram 2 or more regions

 

DATE OF EXAM: 8/7/2020

 

COMPARISON: NONE

 

HISTORY: Chronic neck and back pain.

 

Informed consent was obtained and all the patient's questions were answered.  The L3-L4 level was loc
alized under fluoroscopy.  Standard sterile technique was utilized as well as appropriate local anest
hesia 1% Lidocaine .  Spinal needle was introduced into the thecal sac under fluoroscopic guidance an
d 15 mL's of Isovue 200 was injected.  The patient tolerated the procedure well and left the [facility] in stable condition.  CT myelography is to follow. This report was dictated separately. 

 

8 spot images saved during procedure. Total 3 minutes 13 seconds fluoroscopic time utilized. Images s
how successful accessed and contrast injection. Incidental cholecystectomy clips and overlying stimul
ator.

 

IMPRESSION: Successful myelography lumbar spine for subsequent CT.

## 2020-12-08 ENCOUNTER — HOSPITAL ENCOUNTER (OUTPATIENT)
Dept: HOSPITAL 47 - RADMAMWWP | Age: 50
Discharge: HOME | End: 2020-12-08
Attending: OBSTETRICS & GYNECOLOGY
Payer: MEDICARE

## 2020-12-08 DIAGNOSIS — Z12.31: Primary | ICD-10-CM

## 2020-12-08 DIAGNOSIS — M85.88: ICD-10-CM

## 2020-12-08 DIAGNOSIS — N95.1: ICD-10-CM

## 2020-12-08 PROCEDURE — 77067 SCR MAMMO BI INCL CAD: CPT

## 2020-12-08 PROCEDURE — 77063 BREAST TOMOSYNTHESIS BI: CPT

## 2020-12-08 PROCEDURE — 77080 DXA BONE DENSITY AXIAL: CPT

## 2020-12-08 NOTE — BD
EXAMINATION TYPE: Axial Bone Density

 

DATE OF EXAM: 12/8/2020

 

COMPARISON: NONE

 

CLINICAL HISTORY:

 

Height:  5 FT 2 1/4 IN

Weight:  210

 

FRAX RISK QUESTIONS:

Alcohol (3 or more units per day):  NO

Family History (Parent hip fracture):  YES

Glucocorticoids (More than 3mos):  NO

           (Ex: prednisone, prednisolone, methylprednisolone, dexamethasone, and hydrocortisone).    
     

History of Fracture in Adulthood: NO

Secondary Osteoporosis:

  1.  Type 1 Diabetes: NO

  2.  Hyperthyroidism: NO

  3.  Menopause before 45: NO

  4.  Malnutrition: NO

  5.  Chronic liver disease: NO

Rheumatoid Arthritis: NO

Current Tobacco Use: NO

 

RISK FACTORS 

HISTORY OF: 

Surgery to Spine/Hip(right/left)/Wrist (right/left): NEURO STIMULATER PLACED NEAR C SPINE AND LUMBAR 
SPINE AUG 2020 AND NOV 2020 FOR PAIN

Family History of Osteoporosis: NO

Active: SOMEWHAT

Diet low in dairy products/other sources of calcium:  NO

Postmenopausal woman: ON BIRTH CONTROL FOR TWO YEARS

Lost more than 2 inches in height since high school: NO

 

 

MEDICATIONS: 

Additional Medications: BIRTH CONTROL, CYMBALTA,TOPOMAX, INDURAL, FOLIC ACID, ABILIFY, OXYBUTIN, CLON
AZEPAM, VITAMIN , MONTELUKAST, METHOTREXATE, LATANOPROST, 

 

 

Additional History:

 

 

EXAM MEASUREMENTS: 

Bone mineral densitometry was performed using the [employer] System.

Bone mineral density as measured about the Lumbar spine is:  

----- L1-L4(G/cm2): 1.221

T Score Values are as follows:

----- L2: 0.9

----- L3: 0.8

----- L4: 0.2

----- L1-L4: 0.3

BASELINE

Bone mineral density about the R hip (g/cm2): 1.112

Bone mineral density about the L hip (g/cm2): 1.094

T Score values are as follows:

-----R Neck: 0.5

-----L Neck: 0.4

-----R Total: 1.3

-----L Total: 1.7

BASELINE

 

IMPRESSION:

Normal (Values between +1 and -1 indicate normal bone mass).  Consider repeating this study in 5 year
s or sooner if there is some new clinical indication.

 

NOTE:  T-SCORE=SD OF THE YOUNG ADULT MEAN.

## 2020-12-09 NOTE — MM
Reason for exam: screening  (asymptomatic).

Last mammogram was performed 2 years and 11 months ago.



History:

Family history of breast cancer in mother at age 76.

Taking hormonal contraceptives for 5 years.



Physical Findings:

A clinical breast exam by your physician is recommended on an annual basis and 

results should be correlated with mammographic findings.



MG 3D Screening Mammo W/Cad

Bilateral CC and MLO view(s) were taken.

Prior study comparison: January 9, 2018, bilateral MG 3d screening mammo w/cad.  

November 23, 2016, bilateral MG screening mammo w CAD.

There are scattered fibroglandular densities.  No significant changes when 

compared with prior studies.





ASSESSMENT: Benign, BI-RAD 2



RECOMMENDATION:

Routine screening mammogram of both breasts in 1 year.

## 2021-07-12 ENCOUNTER — HOSPITAL ENCOUNTER (OUTPATIENT)
Dept: HOSPITAL 47 - RADCTMAIN | Age: 51
Discharge: HOME | End: 2021-07-12
Attending: PSYCHIATRY & NEUROLOGY
Payer: MEDICARE

## 2021-07-12 DIAGNOSIS — M47.817: Primary | ICD-10-CM

## 2021-07-12 PROCEDURE — 72131 CT LUMBAR SPINE W/O DYE: CPT

## 2021-07-12 NOTE — CT
EXAMINATION TYPE: CT lumbar spine wo con

 

DATE OF EXAM: 7/12/2021 8:27 AM

 

COMPARISON: 8/7/20 

HISTORY: Lower back pain with pain and numbness down right leg.

 

CT DLP: 1342.9 mGycm

Automated exposure control for dose reduction was used.

 

Unenhanced CT of the lumbar spine was performed.  Bone and soft tissue window settings are submitted 
as well as coronal and sagittal reconstructions. 

 

L1-L2: Normal disc space height.  No disc herniation protrusion or central stenosis.  No facet joint 
arthropathy.  No evidence for foraminal encroachment.

 

L2-L3: Normal disc space height.  No disc herniation protrusion or central stenosis. Mild facet joint
 arthropathy. No evidence for foraminal encroachment.

 

L3-L4: Normal disc space height.  No disc herniation protrusion or central stenosis. Mild facet joint
 arthropathy..  No evidence for foraminal encroachment.

 

L4-L5: Normal disc space height.  No disc herniation protrusion or central stenosis.  No facet joint 
arthropathy.  No evidence for foraminal encroachment.

 

L5-S1: Normal disc space height.  No disc herniation protrusion or central stenosis. Moderate facet j
oint arthropathy redemonstrated. No evidence for foraminal encroachment.

 

IMPRESSION:

Facet joint arthropathy. No significant disc herniation protrusion or central stenosis at this time.

## 2021-10-29 ENCOUNTER — HOSPITAL ENCOUNTER (OUTPATIENT)
Dept: HOSPITAL 47 - ORWHC2ENDO | Age: 51
Discharge: HOME | End: 2021-10-29
Attending: INTERNAL MEDICINE
Payer: MEDICARE

## 2021-10-29 VITALS — BODY MASS INDEX: 45.7 KG/M2

## 2021-10-29 VITALS — DIASTOLIC BLOOD PRESSURE: 81 MMHG | SYSTOLIC BLOOD PRESSURE: 120 MMHG | HEART RATE: 58 BPM

## 2021-10-29 VITALS — TEMPERATURE: 97.8 F | RESPIRATION RATE: 18 BRPM

## 2021-10-29 DIAGNOSIS — F41.9: ICD-10-CM

## 2021-10-29 DIAGNOSIS — F32.9: ICD-10-CM

## 2021-10-29 DIAGNOSIS — G47.33: ICD-10-CM

## 2021-10-29 DIAGNOSIS — Z12.11: Primary | ICD-10-CM

## 2021-10-29 DIAGNOSIS — J45.909: ICD-10-CM

## 2021-10-29 PROCEDURE — 81025 URINE PREGNANCY TEST: CPT

## 2021-10-29 NOTE — P.PCN
Date of Procedure: 10/29/21


Procedure(s) Performed: 


BRIEF HISTORY: Patient is a 51-year-old pleasant white female scheduled for an 

elective colonoscopy as a part of screening for colorectal neoplasia.





PROCEDURE PERFORMED: Colonoscopy. 





PREOPERATIVE DIAGNOSIS: Screening for colon cancer. 





IV sedation per Anesthesia. 





PROCEDURE: After informed consent was obtained, the patient, was brought into 

the endoscopy unit. IV sedation was administered by Anesthesia under continuous 

monitoring.  Digital rectal examination was normal. Initially the Olympus CF-160

flexible video colonoscope was then inserted in the rectum, gradually advanced 

into the cecum without any difficulty. Careful examination was performed as the 

scope was gradually being withdrawn. Ileocecal valve and the appendiceal orifice

were visualized and appeared normal.  Prep was excellent. Mucosa of the cecum, 

ascending colon, transverse colon, descending colon, sigmoid colon, and rectum 

appeared normal. Retroflexion was performed in the rectum and no lesions were 

seen. The patient tolerated the procedure well. 





IMPRESSION: Normal-appearing colon from rectum to cecum with no evidence of 

colorectal neoplasia .





RECOMMENDATIONS:  Findings of this examination were discussed with the patient 

as well as a family.  She was advised to have a repeat screening colonoscopy in 

10 years..

## 2021-12-09 ENCOUNTER — HOSPITAL ENCOUNTER (OUTPATIENT)
Dept: HOSPITAL 47 - RADMAMWWP | Age: 51
Discharge: HOME | End: 2021-12-09
Attending: OBSTETRICS & GYNECOLOGY
Payer: MEDICARE

## 2021-12-09 DIAGNOSIS — Z80.3: ICD-10-CM

## 2021-12-09 DIAGNOSIS — Z12.31: Primary | ICD-10-CM

## 2021-12-09 PROCEDURE — 77063 BREAST TOMOSYNTHESIS BI: CPT

## 2021-12-09 PROCEDURE — 77067 SCR MAMMO BI INCL CAD: CPT

## 2021-12-10 NOTE — MM
Reason for exam: screening  (asymptomatic).

Last mammogram was performed 1 year ago.



History:

Family history of breast cancer in mother at age 76.

Taking hormonal contraceptives for 5 years.



Physical Findings:

A clinical breast exam by your physician is recommended on an annual basis and 

results should be correlated with mammographic findings.



MG 3D Screening Mammo W/Cad

Bilateral CC and MLO view(s) were taken.

Prior study comparison: December 8, 2020, bilateral MG 3d screening mammo w/cad.  

January 9, 2018, bilateral MG 3d screening mammo w/cad.

The breast tissue is heterogeneously dense. This may lower the sensitivity of 

mammography.  There is no discrete abnormality.  No significant changes when 

compared with prior studies.





ASSESSMENT: Negative, BI-RAD 1



RECOMMENDATION:

Routine screening mammogram of both breasts in 1 year.

## 2022-01-26 ENCOUNTER — HOSPITAL ENCOUNTER (OUTPATIENT)
Dept: HOSPITAL 47 - BARWHC3 | Age: 52
End: 2022-01-26
Attending: SURGERY
Payer: MEDICARE

## 2022-01-26 VITALS
HEART RATE: 74 BPM | DIASTOLIC BLOOD PRESSURE: 80 MMHG | SYSTOLIC BLOOD PRESSURE: 120 MMHG | RESPIRATION RATE: 16 BRPM | TEMPERATURE: 98.2 F

## 2022-01-26 VITALS — BODY MASS INDEX: 39.3 KG/M2

## 2022-01-26 DIAGNOSIS — M17.0: ICD-10-CM

## 2022-01-26 DIAGNOSIS — E44.0: ICD-10-CM

## 2022-01-26 DIAGNOSIS — M06.9: ICD-10-CM

## 2022-01-26 DIAGNOSIS — M47.9: ICD-10-CM

## 2022-01-26 DIAGNOSIS — I11.9: ICD-10-CM

## 2022-01-26 DIAGNOSIS — N19: ICD-10-CM

## 2022-01-26 DIAGNOSIS — K50.90: ICD-10-CM

## 2022-01-26 DIAGNOSIS — K74.1: ICD-10-CM

## 2022-01-26 DIAGNOSIS — G89.4: ICD-10-CM

## 2022-01-26 DIAGNOSIS — K21.9: ICD-10-CM

## 2022-01-26 DIAGNOSIS — D50.8: ICD-10-CM

## 2022-01-26 DIAGNOSIS — Z91.041: ICD-10-CM

## 2022-01-26 DIAGNOSIS — E55.9: ICD-10-CM

## 2022-01-26 DIAGNOSIS — H40.9: ICD-10-CM

## 2022-01-26 DIAGNOSIS — F32.A: ICD-10-CM

## 2022-01-26 DIAGNOSIS — G47.00: ICD-10-CM

## 2022-01-26 DIAGNOSIS — Z91.010: ICD-10-CM

## 2022-01-26 DIAGNOSIS — J45.909: ICD-10-CM

## 2022-01-26 DIAGNOSIS — N32.81: ICD-10-CM

## 2022-01-26 DIAGNOSIS — M79.7: ICD-10-CM

## 2022-01-26 DIAGNOSIS — E66.01: Primary | ICD-10-CM

## 2022-01-26 DIAGNOSIS — Z91.018: ICD-10-CM

## 2022-01-26 LAB
CHOLEST SERPL-MCNC: 179 MG/DL (ref 0–200)
ERYTHROCYTE [DISTWIDTH] IN BLOOD BY AUTOMATED COUNT: 4.4 X 10*6/UL (ref 4.1–5.2)
ERYTHROCYTE [DISTWIDTH] IN BLOOD: 13.6 % (ref 11.5–14.5)
HCT VFR BLD AUTO: 42.4 % (ref 37.2–46.3)
HDLC SERPL-MCNC: 42.5 MG/DL (ref 40–60)
HGB BLD-MCNC: 14.3 G/DL (ref 12–15)
LDLC SERPL CALC-MCNC: 108.5 MG/DL (ref 0–131)
MCH RBC QN AUTO: 32.5 PG (ref 27–32)
MCHC RBC AUTO-ENTMCNC: 33.7 G/DL (ref 32–37)
MCV RBC AUTO: 96.4 FL (ref 80–97)
NRBC BLD AUTO-RTO: 0 /100 WBCS (ref 0–0)
PLATELET # BLD AUTO: 268 X 10*3/UL (ref 140–440)
PREALB SERPL-MCNC: 19.6 MG/DL (ref 18–42)
TRIGL SERPL-MCNC: 140 MG/DL (ref 0–149)
VIT B12 SERPL-MCNC: 427 PG/ML (ref 200–944)
VLDLC SERPL CALC-MCNC: 28 MG/DL (ref 5–40)
WBC # BLD AUTO: 7.62 X 10*3/UL (ref 4.5–10)

## 2022-01-26 PROCEDURE — 84630 ASSAY OF ZINC: CPT

## 2022-01-26 PROCEDURE — 82728 ASSAY OF FERRITIN: CPT

## 2022-01-26 PROCEDURE — 84443 ASSAY THYROID STIM HORMONE: CPT

## 2022-01-26 PROCEDURE — 82746 ASSAY OF FOLIC ACID SERUM: CPT

## 2022-01-26 PROCEDURE — 93005 ELECTROCARDIOGRAM TRACING: CPT

## 2022-01-26 PROCEDURE — 85027 COMPLETE CBC AUTOMATED: CPT

## 2022-01-26 PROCEDURE — 85610 PROTHROMBIN TIME: CPT

## 2022-01-26 PROCEDURE — 84255 ASSAY OF SELENIUM: CPT

## 2022-01-26 PROCEDURE — 82607 VITAMIN B-12: CPT

## 2022-01-26 PROCEDURE — 84425 ASSAY OF VITAMIN B-1: CPT

## 2022-01-26 PROCEDURE — 83970 ASSAY OF PARATHORMONE: CPT

## 2022-01-26 PROCEDURE — 80323 ALKALOIDS NOS: CPT

## 2022-01-26 PROCEDURE — 99203 OFFICE O/P NEW LOW 30 MIN: CPT

## 2022-01-26 PROCEDURE — 83540 ASSAY OF IRON: CPT

## 2022-01-26 PROCEDURE — 84134 ASSAY OF PREALBUMIN: CPT

## 2022-01-26 PROCEDURE — 85730 THROMBOPLASTIN TIME PARTIAL: CPT

## 2022-01-26 PROCEDURE — 83735 ASSAY OF MAGNESIUM: CPT

## 2022-01-26 PROCEDURE — 80061 LIPID PANEL: CPT

## 2022-01-26 PROCEDURE — 83550 IRON BINDING TEST: CPT

## 2022-01-26 PROCEDURE — 83036 HEMOGLOBIN GLYCOSYLATED A1C: CPT

## 2022-01-26 PROCEDURE — 84100 ASSAY OF PHOSPHORUS: CPT

## 2022-01-26 PROCEDURE — 82525 ASSAY OF COPPER: CPT

## 2022-01-26 PROCEDURE — 82306 VITAMIN D 25 HYDROXY: CPT

## 2022-01-26 PROCEDURE — 36415 COLL VENOUS BLD VENIPUNCTURE: CPT

## 2022-01-26 PROCEDURE — 84590 ASSAY OF VITAMIN A: CPT

## 2022-01-26 PROCEDURE — 80307 DRUG TEST PRSMV CHEM ANLYZR: CPT

## 2022-01-26 PROCEDURE — 80053 COMPREHEN METABOLIC PANEL: CPT

## 2022-01-26 NOTE — P.HPBAR
Bariatric H&P





- History & Physicial


H&P Date: 01/26/22


History & Physicial: 


Visit/CC: 





Patient initial contact: 





Initial weight: 


Initial weight in pounds: 


Height: 


Initial BMI: 





Last weight: 





Current weight: 


Current weight in pounds: 


Current BMI: 





Ideal body weight (based on NIH guidelines): 





Excess body weight loss: 








The patient is a 51 year-old F who presents for Bariatric Assessment.











DATE OF SERVICE: 01/26/2022





REASON FOR CONSULTATION: Initial bariatric evaluation. 





HISTORY OF PRESENT ILLNESS: Alessia Beltrán is a 51-year-old female who comes with

lifelong morbid obesity. She comes in looking into the gastric band. She has 

tried multiple diets. She had her gallbladder removed. Her sister has trouble 

with weight including her brother. She has tried Ali and protein diets with her 

doctor. She does not want an invasive procedure. She has gastroesophageal reflux

disease. Her highest weight is 228 pounds. She has lost 13 pounds in 3 months. 

She has neurostimulator in the neck and her back for chronic pain. She has 

troubles sleeping. She has insomnia. She has fibromyalgia. She comes first time 

in consultation for weight loss. 





At height of 5 feet 2 inches, her ideal body weight is 135 pounds.  Her highest 

weight is 228 pounds, body mass index 41.8. She comes in 214 pounds.  Her body 

mass index is 39.3.  She is 80 pounds overweight.





PAST MEDICAL HISTORY: 


1.  Morbid obesity due to excess calories


2.  Body mass index of 39.3


3.  Depressive disorder


4.  Glaucoma


5.  Hypertensive heart disease


6.  Overactive bladder


7.  Rheumatoid arthritis


8.  Insomnia


9.  Osteoarthritis of the knees


10.  Chronic pain syndrome


11.  Osteoarthritis lower back


12.  Asthma


13.  Fibromyalgia


14.  Gastroesophageal reflux disease.





PAST SURGICAL HISTORY: 


1.  Knee replacement


2.  Neurostimulator


3.  Cholecystectomy





HOME MEDICATIONS: 


                                Home Medications











 Medication  Instructions  Recorded  Confirmed


 


ARIPiprazole [Abilify] 10 mg PO DAILY 06/17/19 01/27/22


 


DULoxetine HCL [Cymbalta] 60 mg PO DAILY 06/17/19 01/27/22


 


Latanoprost [Xalatan 0.005%] 1 drop BOTH EYES HS 06/17/19 01/27/22


 


Propranolol HCl [Inderal Xl] 80 mg PO DAILY 06/17/19 01/27/22


 


Topiramate [Topamax] 100 mg PO BID 06/17/19 01/27/22


 


clonazePAM [KlonoPIN] 0.5 mg PO TID 06/17/19 01/27/22


 


Oxybutynin Chloride [Ditropan XL] 10 mg PO BID 07/28/20 01/27/22


 


Brimonidine/Dorzolamide/Pf 1 drop BOTH EYES BID 10/27/21 01/27/22





[Brimonidine 0.15%-Dorzolam 2%]   


 


Folic Acid 1 mg PO DAILY 10/27/21 01/27/22


 


Minocycline HCl [Minocin] 100 mg PO DAILY 10/27/21 01/27/22


 


Ramelteon 8 mg PO AS DIRECTED 01/27/22 01/27/22


 


metHOTREXate sodium [Methotrexate] 12.5 mg PO WEEKLY 01/27/22 01/27/22








                                  Previous Rx's











 Medication  Instructions  Recorded


 


Montelukast [Singulair] 10 mg PO HS #30 tab 06/18/19

















ALLERGIES:


                                    Allergies











Allergy/AdvReac Type Severity Reaction Status Date / Time


 


Iodinated Contrast Media Allergy  Rash/Hives Verified 10/29/21 10:15


 


peanut Allergy  Rash/Hives Verified 10/29/21 10:15


 


soy Allergy  Rash/Hives Verified 10/29/21 10:15

















SOCIAL HISTORY: Denies tobacco use.   





FAMILY HISTORY: No family history of ulcerative colitis disease or Crohn's 

disease. Family history of morbid obesity. No lupus in the family.  No reports 

of stomach or esophageal cancer.  Her sister has trouble with weight including 

her brother.





REVIEW OF ORGAN SYSTEMS: 


CONSTITUTIONAL: At height of 5 feet 2 inches, her ideal body weight is 135 

pounds.  Her highest weight is 228 pounds, body mass index 41.8. She comes in 

214 pounds.  Her body mass index is 39.3.  She is 80 pounds overweight.


HEENT: Denies any active troubles with hearing. Glaucoma 


ENDOCRINE: Denies diabetes. Denies hypothyroidism. 


CARDIOVASCULAR: Denies past reports of palpitations or heart attacks or chest 

pain.  Has hypertensive heart disease.


RESPIRATORY: Has daytime somnolence and trouble sleeping. Has asthma.


GASTROINTESTINAL: Denies any bright red blood per rectum.  No diarrhea. No 

constipation. Has gastroesophageal reflux disease.


GENITOURINARY: Has bladder urgency. No recent blood in urine


MUSCULOSKELETAL: Has lower back pain and joint pain.  Denies history of 

bilateral lower extremity edema. Has chronic pain. 


NEURO: Has chronic migraines. No seizure disorders. Has fibromyalgia. 


PSYCH: Has depression. No suicidal ideation. Has anxiety. Has insomnia.


RHEUMATOLOGIC: No lupus.  No rheumatoid arthritis.  


HEMATOLOGIC: Denies any abnormal bleeding or bruising.  Denies past history of 

DVTs. 


SKIN: No rash.  No skin cancer.





PHYSICAL EXAM: 


VITAL SIGNS: Height 5 foot 2 inches, weight 214 pounds. BMI 39.3


                                   Vital Signs











Temp  98.2 F   01/26/22 16:33


 


Pulse  74   01/26/22 16:33


 


Resp  16   01/26/22 16:33


 


BP  120/80   01/26/22 16:33


 


Pulse Ox      

















GENERAL: Well-developed in no acute distress.  


HEENT: No scleral icterus.  Extraocular movements grossly intact.  Hears 

conversational speech.  No nasal drainage.


NECK: Supple without lymphadenopathy. 


CHEST: Nonlabored respirations with equal bilateral excursions. 


CARDIOVASCULAR: Regular rate and regular rhythm. Distal 2+ pulses. 


ABDOMEN: Obese, soft, nontender, nondistended. 


MUSCULOSKELETAL: No clubbing, cyanosis. 


NEURO: No focal or lateralizing signs. Cranial nerves 2 through 12 grossly 

within normal limits. 


PSYCH: Appropriate affect. Alert and oriented to person, place and time.


SKIN: Good skin turgor.  Well perfused.





ASSESSMENT: 


1.  Morbid obesity due to excess calories


2.  Body mass index of 39.3


3.  Depressive disorder


4.  Glaucoma


5.  Hypertensive heart disease


6.  Overactive bladder


7.  Rheumatoid arthritis


8.  Insomnia


9.  Osteoarthritis of the knees


10.  Chronic pain syndrome


11.  Osteoarthritis lower back


12.  Asthma


13.  Fibromyalgia


14.  Gastroesophageal reflux disease.





PLAN: 


1.  Surgical options including a band, gastric bypass, sleeve gastrectomy were 

described in detail.  Alternatives such as gastric balloon including duodenal 

switch were described. 


2.  The [place] bariatric surgical collaborative data and outcomes calculator 

were described with surgical options.


3.  Recommend a bariatric metabolic panel to evaluate for micro- including 

macronutrient deficiencies. 


4.  For history of daytime somnolence, recommend evaluation and treatment for 

sleep apnea.


5.  Dietary surveillance and counseling was reviewed. Increased protein intake 

over 65 grams daily advised.


6.  Will need cardiac risk assessment.


7.  Recommend medical risk assessment.


8.  Psych assessment per insurance guidelines.


9.  Recommend upper endoscopy.


10.  Recommend 12-lead EKG.


11.  Recommend urine nicotine testing pre-op


12.  Recommend urine drug screen





Thank you for this consultation.




















Past Medical History


Past Medical History: Asthma, Fibromyalgia, Sleep Apnea/CPAP/BIPAP


Additional Past Medical History / Comment(s): glaucoma, RP, bulging and 

herniated discs


History of Any Multi-Drug Resistant Organisms: None Reported


Past Surgical History: Orthopedic Surgery


Additional Past Surgical History / Comment(s): neuro stimulator, knee 

replacement


Smoking Status: Never smoker





- Past Family History


  ** Mother


Family Medical History: Cancer


Additional Family Medical History / Comment(s): lung cancer





Results





- Labs





                                 01/26/22 16:28





                                 01/26/22 16:28





Bariatric Checklist


Checklist: 


Plan: 





Checklist: 





EGD: 


1. Hiatal hernia: 


2. H. Pylori: 





HgbA1c: 





Vitamin D: 





Smoking: Never smoker





Primary care physician referral: 





Psychiatry clearance: 





Cardiology clearance: 





Sleep study: 





Diet journal: 





VTE risk score: 





VTE risk level: 





Rehab needs at discharge:

## 2022-01-27 LAB
ALBUMIN SERPL-MCNC: 4.4 G/DL (ref 3.8–4.9)
ALBUMIN/GLOB SERPL: 1.57 G/DL (ref 1.6–3.17)
ALP SERPL-CCNC: 73 U/L (ref 41–126)
ALT SERPL-CCNC: 7 U/L (ref 8–44)
ANION GAP SERPL CALC-SCNC: 16.8 MMOL/L (ref 10–18)
APTT BLD: 31.6 SEC (ref 23.5–31)
AST SERPL-CCNC: 13 U/L (ref 13–35)
BUN SERPL-SCNC: 12.4 MG/DL (ref 9–27)
BUN/CREAT SERPL: 15.5 RATIO (ref 12–20)
CALCIUM SPEC-MCNC: 9.3 MG/DL (ref 8.7–10.3)
CHLORIDE SERPL-SCNC: 107 MMOL/L (ref 96–109)
CO2 SERPL-SCNC: 17.2 MMOL/L (ref 20–27.5)
FERRITIN SERPL-MCNC: 130 NG/ML (ref 10–291)
GLOBULIN SER CALC-MCNC: 2.8 G/DL (ref 1.6–3.3)
GLUCOSE SERPL-MCNC: 79 MG/DL (ref 70–110)
INR PPP: 0.97 (ref 0.9–1.11)
IRON SERPL-MCNC: 85 UG/DL (ref 50–170)
MAGNESIUM SPEC-SCNC: 2 MG/DL (ref 1.5–2.4)
POTASSIUM SERPL-SCNC: 3.9 MMOL/L (ref 3.5–5.5)
PROT SERPL-MCNC: 7.2 G/DL (ref 6.2–8.2)
PT BLD: 11 SEC (ref 9.9–11.9)
SODIUM SERPL-SCNC: 141 MMOL/L (ref 135–145)
TIBC SERPL-MCNC: 308 UG/DL (ref 228–460)
ZINC SERPL-MCNC: 72 UG/DL (ref 60–130)

## 2022-01-28 LAB — VIT B1 BLD-MCNC: 93 UG/L (ref 38–122)

## 2022-01-29 LAB — ANABASINE UR-MCNC: <2 NG/ML (ref ?–2)

## 2022-04-21 ENCOUNTER — HOSPITAL ENCOUNTER (OUTPATIENT)
Dept: HOSPITAL 47 - LABWHC1 | Age: 52
Discharge: HOME | End: 2022-04-21
Attending: INTERNAL MEDICINE
Payer: MEDICARE

## 2022-04-21 DIAGNOSIS — E78.2: Primary | ICD-10-CM

## 2022-04-21 LAB
ALT SERPL-CCNC: 10 U/L (ref 8–44)
ANION GAP SERPL CALC-SCNC: 10.9 MMOL/L (ref 10–18)
AST SERPL-CCNC: 16 U/L (ref 13–35)
BUN SERPL-SCNC: 10.3 MG/DL (ref 9–27)
BUN/CREAT SERPL: 10.3 RATIO (ref 12–20)
CALCIUM SPEC-MCNC: 9.4 MG/DL (ref 8.7–10.3)
CHLORIDE SERPL-SCNC: 112 MMOL/L (ref 96–109)
CHOLEST SERPL-MCNC: 186 MG/DL (ref 0–200)
CO2 SERPL-SCNC: 23.1 MMOL/L (ref 20–27.5)
ERYTHROCYTE [DISTWIDTH] IN BLOOD BY AUTOMATED COUNT: 4.29 X 10*6/UL (ref 4.1–5.2)
ERYTHROCYTE [DISTWIDTH] IN BLOOD: 14.1 % (ref 11.5–14.5)
GLUCOSE SERPL-MCNC: 100 MG/DL (ref 70–110)
HCT VFR BLD AUTO: 42.7 % (ref 37.2–46.3)
HDLC SERPL-MCNC: 36.3 MG/DL (ref 40–60)
HGB BLD-MCNC: 14 G/DL (ref 12–15)
LDLC SERPL CALC-MCNC: 118.1 MG/DL (ref 0–131)
MCH RBC QN AUTO: 32.6 PG (ref 27–32)
MCHC RBC AUTO-ENTMCNC: 32.8 G/DL (ref 32–37)
MCV RBC AUTO: 99.5 FL (ref 80–97)
NRBC BLD AUTO-RTO: 0 /100 WBCS (ref 0–0)
PLATELET # BLD AUTO: 249 X 10*3/UL (ref 140–440)
POTASSIUM SERPL-SCNC: 3.9 MMOL/L (ref 3.5–5.5)
SODIUM SERPL-SCNC: 146 MMOL/L (ref 135–145)
TRIGL SERPL-MCNC: 158 MG/DL (ref 0–149)
VLDLC SERPL CALC-MCNC: 31.6 MG/DL (ref 5–40)
WBC # BLD AUTO: 4.39 X 10*3/UL (ref 4.5–10)

## 2022-04-21 PROCEDURE — 84450 TRANSFERASE (AST) (SGOT): CPT

## 2022-04-21 PROCEDURE — 80048 BASIC METABOLIC PNL TOTAL CA: CPT

## 2022-04-21 PROCEDURE — 84443 ASSAY THYROID STIM HORMONE: CPT

## 2022-04-21 PROCEDURE — 36415 COLL VENOUS BLD VENIPUNCTURE: CPT

## 2022-04-21 PROCEDURE — 85027 COMPLETE CBC AUTOMATED: CPT

## 2022-04-21 PROCEDURE — 84460 ALANINE AMINO (ALT) (SGPT): CPT

## 2022-04-21 PROCEDURE — 80061 LIPID PANEL: CPT

## 2022-05-18 ENCOUNTER — HOSPITAL ENCOUNTER (OUTPATIENT)
Dept: HOSPITAL 47 - RADUSWWP | Age: 52
Discharge: HOME | End: 2022-05-18
Attending: OBSTETRICS & GYNECOLOGY
Payer: MEDICARE

## 2022-05-18 DIAGNOSIS — Z97.5: ICD-10-CM

## 2022-05-18 DIAGNOSIS — N83.209: Primary | ICD-10-CM

## 2022-05-18 PROCEDURE — 76830 TRANSVAGINAL US NON-OB: CPT

## 2022-05-18 PROCEDURE — 76856 US EXAM PELVIC COMPLETE: CPT

## 2022-05-18 NOTE — US
EXAMINATION TYPE: US pelvis complete transvag

 

DATE OF EXAM: 2022

 

COMPARISON: NONE

 

CLINICAL HISTORY: Z97.5 IUD POSITION. IUD position. Kyleena IUD was placed in November. Patient state
s she had abnormal bleeding x 2 months after. .

 

TECHNIQUE:  Transvaginal (TV) and Transabdominal (TA) .  Transabdominal sonographic images of the pel
vis were acquired.  Transvaginal sonographic images were medically necessary to better assess the fol
lowing anatomy: Right ovary and endometrium.

 

Date of LMP:

 

EXAM MEASUREMENTS:

 

Uterus:  8.7 x 5.2 x 3.8 cm

Endometrial Stripe: 0.56 cm

Right Ovary: 2.4 x 1.5 x 2.0 cm

Left Ovary:  3.4 x 2.0 x 1.4 cm

 

 

1. Uterus:  Anteverted  Appears slightly heterogeneous. Anechoic area seen in cervix: 1.3 x 1.0 x 0.9
 cm.

2. Endometrium:  IUD appears to be in upper endometrium.

3. Right Ovary:  Largest anechoic area seen measures 1.1 x 1.1 x 1.1 cm. Hyperechoic focus seen: 0.2 
x 0.2 x 0.1 cm.

4. Left Ovary:  Anechoic area seen: 2.2 x 1.6 x 1.1 cm.

5. Bilateral Adnexa:  Complex area seen adjacent to the left ovary: 1.8 x 2.0 x 1.0 cm.

6. Posterior cul-de-sac:  Appears wnl

 

 

 

IMPRESSION: 

1. IUD within the endometrial canal.

2. Simple appearing ovarian cysts.

3. Complex left adnexal cyst. Underlying neoplasm is not excluded.  Follow-up in 6 weeks is recommend
ed.

## 2022-07-07 ENCOUNTER — HOSPITAL ENCOUNTER (OUTPATIENT)
Dept: HOSPITAL 47 - RADUSWWP | Age: 52
Discharge: HOME | End: 2022-07-07
Attending: OBSTETRICS & GYNECOLOGY
Payer: MEDICARE

## 2022-07-07 DIAGNOSIS — N83.202: Primary | ICD-10-CM

## 2022-07-07 DIAGNOSIS — N88.8: ICD-10-CM

## 2022-07-07 PROCEDURE — 76856 US EXAM PELVIC COMPLETE: CPT

## 2022-07-07 PROCEDURE — 76830 TRANSVAGINAL US NON-OB: CPT

## 2022-07-07 NOTE — US
EXAMINATION TYPE: US pelvic complete

 

DATE OF EXAM: 7/7/2022

 

COMPARISON: US 2022

 

CLINICAL HISTORY: N83.202 Ovarian cyst left side.. Follow up left ovarian cyst

 

TECHNIQUE: .  Transabdominal sonographic images of the pelvis were acquired.  Transvaginal sonographi
c images were medically necessary to better assess the following anatomy:

 

Date of LMP:  Unknown

 

EXAM MEASUREMENTS:

 

Uterus:  7.7 x 4.2 x 3.7 cm

Endometrial Stripe: 0.8 cm

Right Ovary:  not seen 

Left Ovary:  2.9 x 1.5 x 2.5 cm

 

 

 

1. Uterus:  anteverted, heterogeneous, multiple nabothian cysts

2. Endometrium:  appears wnl, IUD seen in place

3. Right Ovary:  not seen

4. Left Ovary:  1.6cm cyst

5. Bilateral Adnexa:  left adnexa: 1.8cm complex area seen adjacent to left ovary 

6. Posterior cul-de-sac:  wnl

 

 

 

IMPRESSION:

1. Complex cyst left ovary could reflect small hemorrhagic cyst. Consider follow-up study in 6 weeks.


2. IUD is noted to be in place.

## 2023-03-18 ENCOUNTER — HOSPITAL ENCOUNTER (EMERGENCY)
Dept: HOSPITAL 47 - EC | Age: 53
Discharge: HOME | End: 2023-03-18
Payer: MEDICARE

## 2023-03-18 VITALS — DIASTOLIC BLOOD PRESSURE: 67 MMHG | TEMPERATURE: 98.2 F | HEART RATE: 85 BPM | SYSTOLIC BLOOD PRESSURE: 103 MMHG

## 2023-03-18 VITALS — RESPIRATION RATE: 18 BRPM

## 2023-03-18 DIAGNOSIS — Z91.048: ICD-10-CM

## 2023-03-18 DIAGNOSIS — F41.9: ICD-10-CM

## 2023-03-18 DIAGNOSIS — R22.43: Primary | ICD-10-CM

## 2023-03-18 DIAGNOSIS — G47.30: ICD-10-CM

## 2023-03-18 DIAGNOSIS — Z91.018: ICD-10-CM

## 2023-03-18 DIAGNOSIS — J45.909: ICD-10-CM

## 2023-03-18 DIAGNOSIS — F32.A: ICD-10-CM

## 2023-03-18 DIAGNOSIS — K59.00: ICD-10-CM

## 2023-03-18 DIAGNOSIS — G47.00: ICD-10-CM

## 2023-03-18 LAB
ALBUMIN SERPL-MCNC: 4.1 G/DL (ref 3.5–5)
ALP SERPL-CCNC: 97 U/L (ref 38–126)
ALT SERPL-CCNC: 48 U/L (ref 4–34)
ANION GAP SERPL CALC-SCNC: 6 MMOL/L
AST SERPL-CCNC: 51 U/L (ref 14–36)
BASOPHILS # BLD AUTO: 0 K/UL (ref 0–0.2)
BASOPHILS NFR BLD AUTO: 1 %
BUN SERPL-SCNC: 14 MG/DL (ref 7–17)
CALCIUM SPEC-MCNC: 9.1 MG/DL (ref 8.4–10.2)
CHLORIDE SERPL-SCNC: 110 MMOL/L (ref 98–107)
CO2 SERPL-SCNC: 23 MMOL/L (ref 22–30)
EOSINOPHIL # BLD AUTO: 0.2 K/UL (ref 0–0.7)
EOSINOPHIL NFR BLD AUTO: 3 %
ERYTHROCYTE [DISTWIDTH] IN BLOOD BY AUTOMATED COUNT: 4.09 M/UL (ref 3.8–5.4)
ERYTHROCYTE [DISTWIDTH] IN BLOOD: 13.9 % (ref 11.5–15.5)
GLUCOSE SERPL-MCNC: 105 MG/DL (ref 74–99)
HCT VFR BLD AUTO: 41.3 % (ref 34–46)
HGB BLD-MCNC: 13.7 GM/DL (ref 11.4–16)
LYMPHOCYTES # SPEC AUTO: 1.7 K/UL (ref 1–4.8)
LYMPHOCYTES NFR SPEC AUTO: 23 %
MCH RBC QN AUTO: 33.5 PG (ref 25–35)
MCHC RBC AUTO-ENTMCNC: 33.1 G/DL (ref 31–37)
MCV RBC AUTO: 101.1 FL (ref 80–100)
MONOCYTES # BLD AUTO: 0.2 K/UL (ref 0–1)
MONOCYTES NFR BLD AUTO: 3 %
NEUTROPHILS # BLD AUTO: 5.3 K/UL (ref 1.3–7.7)
NEUTROPHILS NFR BLD AUTO: 70 %
PLATELET # BLD AUTO: 329 K/UL (ref 150–450)
POTASSIUM SERPL-SCNC: 4.4 MMOL/L (ref 3.5–5.1)
PROT SERPL-MCNC: 6.9 G/DL (ref 6.3–8.2)
SODIUM SERPL-SCNC: 139 MMOL/L (ref 137–145)
WBC # BLD AUTO: 7.6 K/UL (ref 3.8–10.6)

## 2023-03-18 PROCEDURE — 84443 ASSAY THYROID STIM HORMONE: CPT

## 2023-03-18 PROCEDURE — 93005 ELECTROCARDIOGRAM TRACING: CPT

## 2023-03-18 PROCEDURE — 74177 CT ABD & PELVIS W/CONTRAST: CPT

## 2023-03-18 PROCEDURE — 99285 EMERGENCY DEPT VISIT HI MDM: CPT

## 2023-03-18 PROCEDURE — 83880 ASSAY OF NATRIURETIC PEPTIDE: CPT

## 2023-03-18 PROCEDURE — 96374 THER/PROPH/DIAG INJ IV PUSH: CPT

## 2023-03-18 PROCEDURE — 36415 COLL VENOUS BLD VENIPUNCTURE: CPT

## 2023-03-18 PROCEDURE — 80053 COMPREHEN METABOLIC PANEL: CPT

## 2023-03-18 PROCEDURE — 86140 C-REACTIVE PROTEIN: CPT

## 2023-03-18 PROCEDURE — 85025 COMPLETE CBC W/AUTO DIFF WBC: CPT

## 2023-03-18 PROCEDURE — 84484 ASSAY OF TROPONIN QUANT: CPT

## 2023-03-18 PROCEDURE — 96375 TX/PRO/DX INJ NEW DRUG ADDON: CPT

## 2023-03-18 NOTE — ED
Extremity Problem HPI





- General


Chief complaint: Extremity Problem,Nontraumatic


Stated complaint: Leg Swelling,Chest Pain


Time Seen by Provider: 23 12:20


Source: patient, RN notes reviewed


Mode of arrival: ambulatory


Limitations: no limitations





- History of Present Illness


Initial comments: 


This is a 53-year-old female who presents to the emergency department for 

multiple complaints.  States over the last week, she has developed increasing 

swelling in the bilateral lower extremities.  She's also had increasing chest 

pain, shortness of breath, and dizziness.  Believes that she has gained 35 

pounds over the last couple of weeks and she has also started to notice swelling

in her arms.  The legs feel like they're burning and are very painful.  Denies 

any history of similar symptoms in the past.  She was seen at [facility] 4 days ago 

and had lab work, a chest x-ray, and an EKG revealing no acute findings.  She 

was started on Keflex for possible cellulitis.  Her friend states that she has 

continued to get worse since starting treatment. Additionally, she notes that 

she has struggled with insomnia for over a year. 





Denies any fevers, chills, sore throat, cough, dyspnea, palpitations, nausea, 

vomiting, diarrhea, back pain, or headaches.





MD Complaint: extremity swelling


Onset/Timin


-: week(s)


Location: bilateral lower extremity





- Related Data


                                Home Medications











 Medication  Instructions  Recorded  Confirmed


 


ARIPiprazole [Abilify] 10 mg PO DAILY 19


 


DULoxetine HCL [Cymbalta] 60 mg PO DAILY 19


 


Latanoprost [Xalatan 0.005%] 1 drop BOTH EYES  19


 


Propranolol HCl [Inderal Xl] 80 mg PO DAILY 19


 


Topiramate [Topamax] 100 mg PO BID 19


 


Oxybutynin Chloride [Ditropan XL] 20 mg PO DAILY 20


 


Folic Acid 1 mg PO DAILY 10/27/21 03/18/23


 


Minocycline HCl [Minocin] 100 mg PO DAILY 10/27/21 03/18/23


 


Ramelteon 8 mg PO  22


 


metHOTREXate sodium [Methotrexate] 25 mg PO TH 22


 


Brimonidine Tartrate [Alphagan P 1 drops BOTH EYES BID 23





0.15% Ophth Soln]   


 


Cephalexin [Keflex] 500 mg PO QID 23


 


Montelukast [Singulair] 10 mg PO DAILY 23


 


Multivit-Min/Iron/Folic/Lutein 1 tab PO DAILY 23





[Centrum Silver Women Tablet]   








                                  Previous Rx's











 Medication  Instructions  Recorded


 


Furosemide [Lasix] 20 mg PO DAILY #15 tab 23











                                    Allergies











Allergy/AdvReac Type Severity Reaction Status Date / Time


 


Iodinated Contrast Media Allergy  Rash/Hives Verified 23 13:50


 


peanut Allergy  Rash/Hives Verified 23 13:50


 


soy Allergy  Rash/Hives Verified 23 13:50














Review of Systems


ROS Statement: 


Those systems with pertinent positive or pertinent negative responses have been 

documented in the HPI.





ROS Other: All systems not noted in ROS Statement are negative.





Past Medical History


Past Medical History: Asthma, Fibromyalgia, Sleep Apnea/CPAP/BIPAP


Additional Past Medical History / Comment(s): glaucoma, RP, bulging and 

herniated discs


History of Any Multi-Drug Resistant Organisms: None Reported


Past Surgical History: Orthopedic Surgery


Additional Past Surgical History / Comment(s): neuro stimulator, knee replacem

ent


Past Psychological History: Anxiety, Depression


Smoking Status: Never smoker


Past Alcohol Use History: Rare


Past Drug Use History: None Reported





- Past Family History


  ** Mother


Family Medical History: Cancer


Additional Family Medical History / Comment(s): lung cancer





General Exam


Limitations: no limitations


General appearance: alert, in no apparent distress


Head exam: Present: atraumatic, normocephalic, normal inspection


Respiratory exam: Present: normal lung sounds bilaterally.  Absent: respiratory 

distress, wheezes, rales, rhonchi, stridor


Cardiovascular Exam: Present: regular rate, normal rhythm, normal heart sounds. 

 Absent: systolic murmur, diastolic murmur, rubs, gallop, clicks


GI/Abdominal exam: Present: soft, normal bowel sounds.  Absent: distended, 

tenderness, guarding, rebound, rigid


Extremities exam: Present: other (Nonpitting bilateral lower extremity swelling 

and erythema with bilateral calf tenderness. 2+ DP and TP pulses bilaterally and

 capillary refill <1 second.)


Neurological exam: Present: alert, oriented X3, CN II-XII intact


Psychiatric exam: Present: normal affect, normal mood


Skin exam: Present: warm, dry, intact





Course


                                   Vital Signs











  23





  12:13 13:31 16:03


 


Temperature 98 F  98.2 F


 


Pulse Rate 65 75 85


 


Respiratory 22 18 18





Rate   


 


Blood Pressure 166/127 123/78 103/67


 


O2 Sat by Pulse 99 100 99





Oximetry   














Medical Decision Making





- Medical Decision Making


This is a 53-year-old female who presents to the emergency department for chest 

pain and bilateral lower extremity swelling.





Was pt. sent in by a medical professional or institution?


@  -No   


Did you speak to anyone other than the patient for history?  


@  -Her friend


Did you review nursing and triage notes? 


@  -Yes, and I agree, it is accurate with regards to the patient's symptoms.


Were old charts reviewed? 


@  -Yes, lab work and imaging from [facility] on 3/14 revealing a negative Duplex US

 of the bilateral LE, negative chest x-ray, and lab work including a CBC, CMP, 

TSH, lactic acid, troponin, and BNP that were WNL. 


Differential Diagnosis? 


@  -Differential Chest Pain:


Stable Angina, Unstable Angina, STEMI, NSTEMI Aortic Dissection, Pneumothorax, 

Musculoskeletal, Esophageal Spasm GERD, Cholecystitis, Pancreatitis, Zoster, 

this is not meant to be an all-inclusive list.


     -Differential Bilateral LE Swelling:


Hypothyroidism, DVT, IVC thrombosis, liver failure, kidney failure, cellulitis, 

lymphadema, CHF, this is not meant to be an all-inclusive list.


EKG interpreted by me (3pts min.)?


@  -Sinus rhythm.  Ventricular rate 72 bpm, AR interval 127 ms, QRS duration 97 

ms,  ms.


CT interpreted by me (1pt min.)?


@  -Computed tomography scan of the abdomen and pelvis obtained.  My 

interpretation identifies no evidence of bowel wall thickening


What testing was considered but not performed? (CT, X-rays, U/S, labs)? Why?


@  -None


What meds were considered but not given? Why?


@  -None


Did you discuss the management of the patient with other professionals?


@  -No


Did you reconcile home meds?


@  -No


Was smoking cessation discussed for >3mins.?


@  -No


Was critical care preformed (if so, how long)?


@  -No


Were there social determinants of health that impacted care today? How? 

(Homelessness, low income, unemployed, alcoholism, drug addiction, 

transportation, low edu. Level, literacy, decrease access to med. care, long term, 

rehab)?


@  -No


Was there de-escalation of care discussed even if they declined? (Discuss DNR or

 withdrawal of care, Hospice)?


@  -No


What co-morbidities impacted this encounter? (DM, HTN, Smoking, COPD, CAD, 

Cancer, CVA, Hep., AIDS, mental health diagnosis, sleep apnea, morbid obesity)?


@  -Obesity, fibromyalgia, AMAURY


Was patient admitted / discharged?


@  -Discharged.  Lab work obtained and found to be nonactionable.  Computed 

tomography scan of the abdomen and pelvis was obtained to look for any evidence 

of a bowel obstruction or IVC thrombosis that could contribute to these 

symptoms.  No irregularities were found to account for her symptoms.  Dr. Collier

 evaluated the patient alongside of me as well, and we were unable to 

definitively determine what may be causing her symptoms, as she did not have any

 signs of CHF, hypothyroidism, liver failure, kidney failure, or thrombosis.  

Discussed that this may be related to obstructive sleep apnea.  Patient has this

 listed as a diagnosis, but cannot recall if she has ever been formally diagno

sed.  She does not wear a CPAP, nor has she ever been told to wear one.  Given 

the insomnia, weight gain, and swelling, this is a possibility.  She was given 

information for follow-up with a local sleep specialist.  Advised she contact 

them for a follow-up appointment.  Additionally, patient inquired about trying a

 diuretic.  Advised that this is not a pitting edema to suggest water retention,

 and it therefore may not be effective.  However, I am willing to try it.  

Prescription for Lasix provided.  Advised she take it in the morning, as it will

 increase her urinary frequency.  Otherwise advised she follow up with her 

primary care provider as soon as possible for reevaluation of symptoms and 

discussion of any additional testing.


Undiagnosed new problem with uncertain prognosis?


@  -None


Drug Therapy requiring intensive monitoring for toxicity (Heparin, Nitro, 

Insulin, Cardizem)?


@  -None


Were any procedures done?


@  -None


Diagnosis/symptom?


@  -Bilateral LE swelling, weight gain, chest pain


Acute, or Chronic, or Acute on Chronic?


@  -Acute


Uncomplicated (without systemic symptoms) or Complicated (systemic symptoms)?


@  -Uncomplicated


Side effects of treatment?


@  -None


Exacerbation, Progression, or Severe Exacerbation]


@  -Not applicable


Poses a threat to life or bodily function?


@  -No


Diagnosis/symptom?


@  -Insomnia


Acute, or Chronic, or Acute on Chronic?


@  -Chronic


Uncomplicated (without systemic symptoms) or Complicated (systemic symptoms)?


@  -Uncomplicated


Side effects of treatment?


@  -None


Exacerbation, Progression, or Severe Exacerbation]


@  -Progression


Poses a threat to life or bodily function?


@  -No





Return precautions reviewed in depth, the patient is instructed to return to the

emergency department with any new, worsening, or concerning symptoms. Patient 

verbalized understanding. 





This case was discussed in detail with the attending ED physician, Dr. Collier. 

Presentation, findings, and treatment plan discussed in detail as well. 








- Lab Data


Result diagrams: 


                                 23 13:27





                                 23 13:27


                                   Lab Results











  23 Range/Units





  13:27 13:27 13:27 


 


WBC  7.6    (3.8-10.6)  k/uL


 


RBC  4.09    (3.80-5.40)  m/uL


 


Hgb  13.7    (11.4-16.0)  gm/dL


 


Hct  41.3    (34.0-46.0)  %


 


MCV  101.1 H    (80.0-100.0)  fL


 


MCH  33.5    (25.0-35.0)  pg


 


MCHC  33.1    (31.0-37.0)  g/dL


 


RDW  13.9    (11.5-15.5)  %


 


Plt Count  329    (150-450)  k/uL


 


MPV  7.2    


 


Neutrophils %  70    %


 


Lymphocytes %  23    %


 


Monocytes %  3    %


 


Eosinophils %  3    %


 


Basophils %  1    %


 


Neutrophils #  5.3    (1.3-7.7)  k/uL


 


Lymphocytes #  1.7    (1.0-4.8)  k/uL


 


Monocytes #  0.2    (0-1.0)  k/uL


 


Eosinophils #  0.2    (0-0.7)  k/uL


 


Basophils #  0.0    (0-0.2)  k/uL


 


Macrocytosis  Slight    


 


Sodium   139   (137-145)  mmol/L


 


Potassium   4.4   (3.5-5.1)  mmol/L


 


Chloride   110 H   ()  mmol/L


 


Carbon Dioxide   23   (22-30)  mmol/L


 


Anion Gap   6   mmol/L


 


BUN   14   (7-17)  mg/dL


 


Creatinine   0.82   (0.52-1.04)  mg/dL


 


Est GFR (CKD-EPI)AfAm   >90   (>60 ml/min/1.73 sqM)  


 


Est GFR (CKD-EPI)NonAf   82   (>60 ml/min/1.73 sqM)  


 


Glucose   105 H   (74-99)  mg/dL


 


Calcium   9.1   (8.4-10.2)  mg/dL


 


Total Bilirubin   0.6   (0.2-1.3)  mg/dL


 


AST   51 H   (14-36)  U/L


 


ALT   48 H   (4-34)  U/L


 


Alkaline Phosphatase   97   ()  U/L


 


Troponin I     (0.000-0.034)  ng/mL


 


C-Reactive Protein   <0.5   (<1.0)  mg/dL


 


NT-Pro-B Natriuret Pep    59  pg/mL


 


Total Protein   6.9   (6.3-8.2)  g/dL


 


Albumin   4.1   (3.5-5.0)  g/dL


 


TSH   2.490   (0.465-4.680)  mIU/L














  23 Range/Units





  13:27 


 


WBC   (3.8-10.6)  k/uL


 


RBC   (3.80-5.40)  m/uL


 


Hgb   (11.4-16.0)  gm/dL


 


Hct   (34.0-46.0)  %


 


MCV   (80.0-100.0)  fL


 


MCH   (25.0-35.0)  pg


 


MCHC   (31.0-37.0)  g/dL


 


RDW   (11.5-15.5)  %


 


Plt Count   (150-450)  k/uL


 


MPV   


 


Neutrophils %   %


 


Lymphocytes %   %


 


Monocytes %   %


 


Eosinophils %   %


 


Basophils %   %


 


Neutrophils #   (1.3-7.7)  k/uL


 


Lymphocytes #   (1.0-4.8)  k/uL


 


Monocytes #   (0-1.0)  k/uL


 


Eosinophils #   (0-0.7)  k/uL


 


Basophils #   (0-0.2)  k/uL


 


Macrocytosis   


 


Sodium   (137-145)  mmol/L


 


Potassium   (3.5-5.1)  mmol/L


 


Chloride   ()  mmol/L


 


Carbon Dioxide   (22-30)  mmol/L


 


Anion Gap   mmol/L


 


BUN   (7-17)  mg/dL


 


Creatinine   (0.52-1.04)  mg/dL


 


Est GFR (CKD-EPI)AfAm   (>60 ml/min/1.73 sqM)  


 


Est GFR (CKD-EPI)NonAf   (>60 ml/min/1.73 sqM)  


 


Glucose   (74-99)  mg/dL


 


Calcium   (8.4-10.2)  mg/dL


 


Total Bilirubin   (0.2-1.3)  mg/dL


 


AST   (14-36)  U/L


 


ALT   (4-34)  U/L


 


Alkaline Phosphatase   ()  U/L


 


Troponin I  <0.012  (0.000-0.034)  ng/mL


 


C-Reactive Protein   (<1.0)  mg/dL


 


NT-Pro-B Natriuret Pep   pg/mL


 


Total Protein   (6.3-8.2)  g/dL


 


Albumin   (3.5-5.0)  g/dL


 


TSH   (0.465-4.680)  mIU/L














- Radiology Data


Radiology results: report reviewed, image reviewed





Disposition


Clinical Impression: 


 Localized swelling of both lower legs, Insomnia





Disposition: HOME SELF-CARE


Condition: Fair


Instructions (If sedation given, give patient instructions):  Leg Edema (ED), 

Insomnia (ED)


Additional Instructions: 


Return to the emergency department with any new, worsening, or concerning 

symptoms.  I have listed two sleep specialists below.  Contact them and let them

know that you were seen in the emergency department and there is concern about 

sleep apnea contributing to your swelling, insomnia, and difficulty breathing, 

and they will schedule you for a follow-up appointment.  Try taking the Lasix.  

Take this in the morning, as it will increase your urinary frequency.  Follow up

with your primary care provider in 1-2 days.


Prescriptions: 


Furosemide [Lasix] 20 mg PO DAILY #15 tab


Is patient prescribed a controlled substance at d/c from ED?: No


Referrals: 


Henok Erickson MD [Primary Care Provider] - 1-2 days


Gurmeet Barnett MD [STAFF PHYSICIAN] - 1-2 days


Wally Han MD [STAFF PHYSICIAN] - 1-2 days

## 2023-03-18 NOTE — CT
EXAMINATION TYPE: CT abdomen pelvis w con

 

DATE OF EXAM: 3/18/2023

 

COMPARISON:

 

HISTORY: LEG SWELLING,  CHEST PAIN, ABD DISTENTION

 

CT DLP: 1220.9 mGycm

Automated exposure control for dose reduction was used.

 

CONTRAST: 

Performed with IV Contrast, patient injected with 100 ML mL of Isovue 300.

Images obtained from the diaphragm to the floor the pelvis with IV contrast.

 

There is mild subsegmental atelectasis at the lung bases. Heart size is normal. No pericardial effusi
on. No pleural effusion.

 

Liver spleen appear intact. There are clips from cholecystectomy. No pancreatic mass. The stomach is 
intact.

 

There is no adrenal mass. Kidneys have normal size and contour. No hydronephrosis. There is a 4 mm ca
lculus lower pole right kidney. No retroperitoneal adenopathy. No inguinal hernia. Latter distends sm
oothly. There is IUD in the uterine fundus. Uterus is anteverted. No pelvic mass. No free fluid in th
e pelvis. There is mildly thickened appendix which measures up to 9 mm.

There is likely some calcification at the tip of the appendix. No surrounding inflammation seen.

 

There is some mild retained fecal material in the large bowel. There is bilateral posterior implants 
with neural stimulators in the thoracic spine.

 

There is no mesenteric edema. No ascites or free air. No sign of a bowel obstruction. The lumbar vert
ebrae have normal alignment. Posterior elements are intact. No compression fracture. Bony pelvis is i
ntact. The hip joints are intact.

 

IMPRESSION:

Mild constipation. Borderline thickened appendix. Appendicolith. Nonobstructing right renal calculus.